# Patient Record
Sex: FEMALE | Race: ASIAN | NOT HISPANIC OR LATINO | URBAN - METROPOLITAN AREA
[De-identification: names, ages, dates, MRNs, and addresses within clinical notes are randomized per-mention and may not be internally consistent; named-entity substitution may affect disease eponyms.]

---

## 2024-11-03 ENCOUNTER — INPATIENT (INPATIENT)
Facility: HOSPITAL | Age: 69
LOS: 2 days | Discharge: HOME | DRG: 379 | End: 2024-11-06
Attending: INTERNAL MEDICINE | Admitting: INTERNAL MEDICINE
Payer: MEDICARE

## 2024-11-03 VITALS
WEIGHT: 126.99 LBS | RESPIRATION RATE: 20 BRPM | HEIGHT: 65 IN | OXYGEN SATURATION: 100 % | SYSTOLIC BLOOD PRESSURE: 111 MMHG | TEMPERATURE: 98 F | HEART RATE: 89 BPM | DIASTOLIC BLOOD PRESSURE: 76 MMHG

## 2024-11-03 DIAGNOSIS — K92.1 MELENA: ICD-10-CM

## 2024-11-03 DIAGNOSIS — K92.2 GASTROINTESTINAL HEMORRHAGE, UNSPECIFIED: ICD-10-CM

## 2024-11-03 DIAGNOSIS — Z29.9 ENCOUNTER FOR PROPHYLACTIC MEASURES, UNSPECIFIED: ICD-10-CM

## 2024-11-03 DIAGNOSIS — N39.0 URINARY TRACT INFECTION, SITE NOT SPECIFIED: ICD-10-CM

## 2024-11-03 DIAGNOSIS — I10 ESSENTIAL (PRIMARY) HYPERTENSION: ICD-10-CM

## 2024-11-03 LAB
ALBUMIN SERPL ELPH-MCNC: 3.1 G/DL — LOW (ref 3.5–5)
ALP SERPL-CCNC: 28 U/L — LOW (ref 40–120)
ALT FLD-CCNC: 30 U/L DA — SIGNIFICANT CHANGE UP (ref 10–60)
ANION GAP SERPL CALC-SCNC: 6 MMOL/L — SIGNIFICANT CHANGE UP (ref 5–17)
APPEARANCE UR: ABNORMAL
APTT BLD: 23.2 SEC — LOW (ref 24.5–35.6)
AST SERPL-CCNC: 23 U/L — SIGNIFICANT CHANGE UP (ref 10–40)
BASOPHILS # BLD AUTO: 0.04 K/UL — SIGNIFICANT CHANGE UP (ref 0–0.2)
BASOPHILS NFR BLD AUTO: 0.4 % — SIGNIFICANT CHANGE UP (ref 0–2)
BILIRUB SERPL-MCNC: 0.3 MG/DL — SIGNIFICANT CHANGE UP (ref 0.2–1.2)
BILIRUB UR-MCNC: NEGATIVE — SIGNIFICANT CHANGE UP
BUN SERPL-MCNC: 31 MG/DL — HIGH (ref 7–18)
CALCIUM SERPL-MCNC: 8 MG/DL — LOW (ref 8.4–10.5)
CHLORIDE SERPL-SCNC: 111 MMOL/L — HIGH (ref 96–108)
CO2 SERPL-SCNC: 24 MMOL/L — SIGNIFICANT CHANGE UP (ref 22–31)
COLOR SPEC: YELLOW — SIGNIFICANT CHANGE UP
CREAT SERPL-MCNC: 0.56 MG/DL — SIGNIFICANT CHANGE UP (ref 0.5–1.3)
DIFF PNL FLD: ABNORMAL
EGFR: 98 ML/MIN/1.73M2 — SIGNIFICANT CHANGE UP
EOSINOPHIL # BLD AUTO: 0.02 K/UL — SIGNIFICANT CHANGE UP (ref 0–0.5)
EOSINOPHIL NFR BLD AUTO: 0.2 % — SIGNIFICANT CHANGE UP (ref 0–6)
FOLATE SERPL-MCNC: 6 NG/ML — SIGNIFICANT CHANGE UP
GLUCOSE SERPL-MCNC: 122 MG/DL — HIGH (ref 70–99)
GLUCOSE UR QL: 100 MG/DL
HCT VFR BLD CALC: 17.9 % — CRITICAL LOW (ref 34.5–45)
HGB BLD-MCNC: 6 G/DL — CRITICAL LOW (ref 11.5–15.5)
IMM GRANULOCYTES NFR BLD AUTO: 0.6 % — SIGNIFICANT CHANGE UP (ref 0–0.9)
INR BLD: 1.1 RATIO — SIGNIFICANT CHANGE UP (ref 0.85–1.16)
IRON SATN MFR SERPL: 86 UG/DL — SIGNIFICANT CHANGE UP (ref 40–150)
KETONES UR-MCNC: ABNORMAL MG/DL
LEUKOCYTE ESTERASE UR-ACNC: ABNORMAL
LIDOCAIN IGE QN: 37 U/L — SIGNIFICANT CHANGE UP (ref 13–75)
LYMPHOCYTES # BLD AUTO: 1.72 K/UL — SIGNIFICANT CHANGE UP (ref 1–3.3)
LYMPHOCYTES # BLD AUTO: 16.8 % — SIGNIFICANT CHANGE UP (ref 13–44)
MCHC RBC-ENTMCNC: 32.1 PG — SIGNIFICANT CHANGE UP (ref 27–34)
MCHC RBC-ENTMCNC: 33.5 G/DL — SIGNIFICANT CHANGE UP (ref 32–36)
MCV RBC AUTO: 95.7 FL — SIGNIFICANT CHANGE UP (ref 80–100)
MONOCYTES # BLD AUTO: 0.3 K/UL — SIGNIFICANT CHANGE UP (ref 0–0.9)
MONOCYTES NFR BLD AUTO: 2.9 % — SIGNIFICANT CHANGE UP (ref 2–14)
NEUTROPHILS # BLD AUTO: 8.12 K/UL — HIGH (ref 1.8–7.4)
NEUTROPHILS NFR BLD AUTO: 79.1 % — HIGH (ref 43–77)
NITRITE UR-MCNC: NEGATIVE — SIGNIFICANT CHANGE UP
NRBC # BLD: 0 /100 WBCS — SIGNIFICANT CHANGE UP (ref 0–0)
PH UR: 5 — SIGNIFICANT CHANGE UP (ref 5–8)
PLATELET # BLD AUTO: 240 K/UL — SIGNIFICANT CHANGE UP (ref 150–400)
POTASSIUM SERPL-MCNC: 4.2 MMOL/L — SIGNIFICANT CHANGE UP (ref 3.5–5.3)
POTASSIUM SERPL-SCNC: 4.2 MMOL/L — SIGNIFICANT CHANGE UP (ref 3.5–5.3)
PROT SERPL-MCNC: 5.8 G/DL — LOW (ref 6–8.3)
PROT UR-MCNC: NEGATIVE MG/DL — SIGNIFICANT CHANGE UP
PROTHROM AB SERPL-ACNC: 12.7 SEC — SIGNIFICANT CHANGE UP (ref 9.9–13.4)
RBC # BLD: 1.87 M/UL — LOW (ref 3.8–5.2)
RBC # FLD: 13.7 % — SIGNIFICANT CHANGE UP (ref 10.3–14.5)
SODIUM SERPL-SCNC: 141 MMOL/L — SIGNIFICANT CHANGE UP (ref 135–145)
SP GR SPEC: 1.02 — SIGNIFICANT CHANGE UP (ref 1–1.03)
TROPONIN I, HIGH SENSITIVITY RESULT: 4.9 NG/L — SIGNIFICANT CHANGE UP
UROBILINOGEN FLD QL: 0.2 MG/DL — SIGNIFICANT CHANGE UP (ref 0.2–1)
VIT B12 SERPL-MCNC: 707 PG/ML — SIGNIFICANT CHANGE UP (ref 232–1245)
WBC # BLD: 10.26 K/UL — SIGNIFICANT CHANGE UP (ref 3.8–10.5)
WBC # FLD AUTO: 10.26 K/UL — SIGNIFICANT CHANGE UP (ref 3.8–10.5)

## 2024-11-03 PROCEDURE — 74177 CT ABD & PELVIS W/CONTRAST: CPT | Mod: 26,MC

## 2024-11-03 PROCEDURE — 99285 EMERGENCY DEPT VISIT HI MDM: CPT

## 2024-11-03 PROCEDURE — 71045 X-RAY EXAM CHEST 1 VIEW: CPT | Mod: 26

## 2024-11-03 RX ORDER — ASPIRIN/MAG CARB/ALUMINUM AMIN 325 MG
1 TABLET ORAL
Refills: 0 | DISCHARGE

## 2024-11-03 RX ORDER — PANTOPRAZOLE SODIUM 40 MG/1
40 TABLET, DELAYED RELEASE ORAL ONCE
Refills: 0 | Status: COMPLETED | OUTPATIENT
Start: 2024-11-03 | End: 2024-11-03

## 2024-11-03 RX ORDER — CEFTRIAXONE SODIUM 10 G
1000 VIAL (EA) INJECTION ONCE
Refills: 0 | Status: COMPLETED | OUTPATIENT
Start: 2024-11-03 | End: 2024-11-03

## 2024-11-03 RX ORDER — PANTOPRAZOLE SODIUM 40 MG/1
40 TABLET, DELAYED RELEASE ORAL EVERY 12 HOURS
Refills: 0 | Status: DISCONTINUED | OUTPATIENT
Start: 2024-11-04 | End: 2024-11-05

## 2024-11-03 RX ORDER — CEFTRIAXONE SODIUM 10 G
1000 VIAL (EA) INJECTION EVERY 24 HOURS
Refills: 0 | Status: COMPLETED | OUTPATIENT
Start: 2024-11-04 | End: 2024-11-06

## 2024-11-03 RX ORDER — SODIUM CHLORIDE 9 MG/ML
1000 INJECTION, SOLUTION INTRAMUSCULAR; INTRAVENOUS; SUBCUTANEOUS ONCE
Refills: 0 | Status: COMPLETED | OUTPATIENT
Start: 2024-11-03 | End: 2024-11-03

## 2024-11-03 RX ORDER — ACETAMINOPHEN 500 MG
1000 TABLET ORAL ONCE
Refills: 0 | Status: COMPLETED | OUTPATIENT
Start: 2024-11-03 | End: 2024-11-03

## 2024-11-03 RX ADMIN — PANTOPRAZOLE SODIUM 40 MILLIGRAM(S): 40 TABLET, DELAYED RELEASE ORAL at 16:19

## 2024-11-03 RX ADMIN — Medication 100 MILLIGRAM(S): at 14:23

## 2024-11-03 RX ADMIN — SODIUM CHLORIDE 1000 MILLILITER(S): 9 INJECTION, SOLUTION INTRAMUSCULAR; INTRAVENOUS; SUBCUTANEOUS at 14:23

## 2024-11-03 NOTE — ED PROVIDER NOTE - CLINICAL SUMMARY MEDICAL DECISION MAKING FREE TEXT BOX
Patient presenting with dizziness lightheadedness endorses diarrhea also noting stool has been dark will obtain lab CT assess for surgical abdomen fluid hydrate and reassess

## 2024-11-03 NOTE — H&P ADULT - NSHPPHYSICALEXAM_GEN_ALL_CORE
GENERAL: NAD, lying in bed comfortably   HEAD:  Atraumatic, Normocephalic  EYES: clear conjunctiva and  sclera   ENT: Moist mucous membranes  NECK: Supple  LUNG: Clear to auscultation bilaterally. No rales, wheezing,   HEART: Regular rate and rhythm; No murmurs,   ABDOMEN:+ Diffused abdominal tenderness, Bowel sounds present; Soft,  Nondistended, Negative Rovsing's sign   EXTREMITIES:  2+ Peripheral Pulses, . No clubbing, cyanosis, or edema  NERVOUS SYSTEM:  AAOX3, speech clear. No deficits   SKIN: No rashes or lesions GENERAL: NAD, lying in bed comfortably   HEAD:  Atraumatic, Normocephalic  EYES: clear conjunctiva and  sclera   ENT: Moist mucous membranes  NECK: Supple  LUNG: Clear to auscultation bilaterally. No rales, wheezing,   HEART: Regular rate and rhythm; No murmurs,   ABDOMEN:+ Diffused abdominal tenderness, Bowel sounds present; Soft,  Nondistended,   RECTAL: Dark stool noted in the anal canal, no fissure  ( Chaperone PCA Maria Luisa)  EXTREMITIES:  2+ Peripheral Pulses, . No clubbing, cyanosis, or edema  NERVOUS SYSTEM:  AAOX3, speech clear. No deficits   SKIN: No rashes or lesions

## 2024-11-03 NOTE — H&P ADULT - ASSESSMENT
70 yo F, from home with PMH of HTN,  present with 3 days history of  diarrhea ( 3x/day), dark stool and diffused abdominal pain,  Admitted to medicine for Melena 2/2 chronic gastritis  70 yo F, from home with PMH of HTN,  present with 3 days history of  diarrhea ( 3x/day), dark stool and diffused abdominal pain,  Admitted to medicine for Melena 2/2 chronic gastritis, UTI      MED [facility]

## 2024-11-03 NOTE — H&P ADULT - PROBLEM SELECTOR PLAN 1
p/w 3 days history of abdominal pain, diarrhea (3x/day), black stool  v/s BP 95/63, HR 65, Temp 98.5  PE: Diffused abdominal tenderness  Patient recently had EGD 10/30/24 which showed Duodenitis without bleeding, Chronic superficcial gastritis without bleeding. (REPORT IN CHART)  s/p 2 pRBC, PPI, CTX in the ED   - Started IV PPI BID  - DIET: NPO   - Transfuse if Hgb < 7   - Consult GI in the AM   - p/w 3 days history of abdominal pain, diarrhea (3x/day), black stool  v/s BP 95/63, HR 65, Temp 98.5  Hgb 6 on admission   PE: Diffused abdominal tenderness, dark stool noted in the anal canal  Patient recently had EGD 10/30/24 which showed Duodenitis without bleeding, Chronic superficcial gastritis without bleeding. (REPORT IN CHART)  CTA&P: Negative   s/p 2 pRBC, PPI, CTX in the ED   - Started IV PPI BID  - DIET: NPO   - Transfuse if Hgb < 7   - Consult GI in the AM   - CBC Q4hrs p/w 3 days history of abdominal pain, diarrhea (3x/day), black stool  v/s BP 95/63, HR 65, Temp 98.5  Hgb 6 on admission   PE: Diffused abdominal tenderness, dark stool noted in the anal canal  Patient recently had EGD 10/30/24 which showed Duodenitis without bleeding, Chronic superficcial gastritis without bleeding. (REPORT IN CHART)  CTA&P: Negative   s/p 2 pRBC, PPI, CTX in the ED   - Started IV PPI BID  - DIET: NPO   - Transfuse if Hgb < 7   - CBC Q4hrs  - f/u Iron studies   - GI consult in the AM

## 2024-11-03 NOTE — H&P ADULT - ATTENDING COMMENTS
68 yo F, from home with PMH of HTN,  present with 3 days history of  diarrhea ( 3x/day), dark stool and diffused abdominal pain,  Patient recently had a routine EGD done 4 days ago which showed she had Duodenitis without bleeding and chronic superficial gastritis without bleeding. Patient reports a day after the EGD that's when she developed the abdominal pain,  diarrhea and black stools. Today patient started having dizziness with room spinning sensation. Patient reports that she takes Aspirin daily and  another pain medication which her PCP prescribed. Patient denies fever, chest pain, SOB, vomiting, nausea,  hematuria, dysuria , urinary frequency/urgency     In the ED,   v/s: 95/63, 65, 98.5, 99% RA   Labs: WBC 10k, Hgb 6, HCT 17.9, Trop 4.9  EK BPM, NSR   UA: Positive   CXR: Neg   CTA&P with iv: Negative   s/p CTX, PPI, 2pRBC  s/p 1L NS bolus        assessment  --  symptomatic anemia likely 2nd to gi bleed, uti, h/o htn, gastritis, duodenitis    plan  --  admit to med hold aspirin, transfuse 2 units prbc, rocephin, cont preadmit home meds, gi and dvt prophylaxis  cbc, bmp, mg, phos, lipids, tsh, bld cx, ua, ucx, iron panel,     gi cons

## 2024-11-03 NOTE — H&P ADULT - NSHPREVIEWOFSYSTEMS_GEN_ALL_CORE
Patient is seen at the request of Edward Sweeney MD    Subjective     Patient ID: Kathy is a 69 year old female.  Reason for Consult:    Chief Complaint   Patient presents with   • Office Visit   • Follow-up   \      HPI:   ==== Kathy is a 69-year-old lady presenting for cardiology follow-up, 20 by her daughter, had hip surgery January 16 at [facility] continue to get physical therapy walking with a walker since then she had been taking the nifedipine half a tablet as needed if blood pressure is high??  At any rate she has not used it much she was taking of the Norco and tramadol and taking now 600 mg ibuprofen 1-2 times a day    He is still taking some meclizine for dizziness even though she does not have true vertigo    She is not sleeping well she cannot say why and what is bothering her at night definitely is not the palpitation is not the chest pain sometimes she feels it is just laying down makes her uncomfortable and feel discomfort in her chest and shortness of breath             Past Medical History:   Diagnosis Date   • Allergy    • Chest pain    • Essential (primary) hypertension    • GERD (gastroesophageal reflux disease)    • High cholesterol    • Light headedness    • OAB (overactive bladder)    • Obese    • Osteoarthritis    • Sciatica    • Severe left inguinal pain    • SOB (shortness of breath)    • Use of cane as ambulatory aid     uses only at certain times- NOT all the time   • Vertigo    • Wears glasses    • Wears partial dentures        ALLERGIES:   Allergen Reactions   • Clindamycin Other (See Comments)     \"doesn't remember reaction\"   • Lasix PRURITUS   • Penicillins PRURITUS   • Sulfa Antibiotics HIVES        Past Surgical History:   Procedure Laterality Date   • Breast surgery      biopsy w/ chip placement    • Colonoscopy     • Egd     • Foot surgery Bilateral     some bone removed from the toes of each foot   • Remv 2nd cataract,corn-scler sectn Bilateral    •  Shoulder surgery Right    • Thyroidectomy, partial     • Tibial traction pin Right     right ankle/leg ORIF   • Tubal ligation         Family History   Problem Relation Age of Onset   • Stroke Mother    • Tuberculosis Mother    • Patient is unaware of any medical problems Father    • Diabetes Sister    • Stroke Other    • Myocardial Infarction Other        Social History     Tobacco Use   • Smoking status: Former     Types: Cigarettes   • Smokeless tobacco: Never   • Tobacco comments:     quit over 35 years ago   Vaping Use   • Vaping Use: Former   Substance Use Topics   • Alcohol use: Yes     Comment: on rare occasions   • Drug use: Never        Recent hospitalization: [facility] from 5 18-5 19 for shortness of breath     Review of Systems:   ================    Constitutional:  No chills, and no malaise  Eyes:  No change in eyesight, no pain  Ears, nose, mouth, throat, and face:  No pain  Respiratory:  No hemoptysis  Cardiovascular:  No palpitation  Gastrointestinal:  No melena  Genitourinary:  No hematuria  Musculoskeletal:  No muscle pain  Neurological:  No change in speech  Behvioral/Psych:  No change in mood      Objective  =========    Vitals:    03/07/23 1034 03/07/23 1052   BP: 135/62 131/59   BP Location: LUE - Left upper extremity RUE - Right upper extremity   Patient Position: Sitting Sitting   Pulse: 67 65   Resp: 16 16   Temp:  98 °F (36.7 °C)   TempSrc:  Oral   SpO2: 99% 99%   Weight: 95.7 kg (210 lb 13.9 oz) 95.7 kg (210 lb 13.9 oz)   Height: 5' 4\" (1.626 m) 5' 4\" (1.626 m)   PainSc:  0  0             Physical Exam:  =============      General: Alert, cooperative, no distress.  Head: No obvious abnormality  Eyes: Normal, no jaundice.  Throat: Lips, mucosa, moist and normal.  Neck: Supple.Carotid pulses: Normal without bruits.  Back: Symmetric.  Lungs: Clear, no wheezing, no rhonchi and no rales.  Heart:  Auscultation of heart: Bigeminal rhythm with a soft ejection systolic murmur.     Abdomen   No masses, tenderness or hepatosplenomegaly.    Examination of extremities  No peripheral edema.    Musculoskeletal   Normal gait, normal muscle tone.    Neurologic   Oriented to person, place and time. Normal affect.  No apparent deficit        Lab:  ===      CBC:   Lab Results   Component Value Date    WBC 4.8 12/29/2022    WBC 5.1 11/03/2020    RBC 5.03 12/29/2022    RBC 5.46 (H) 11/03/2020     BMP:   Lab Results   Component Value Date    GLUCOSE 91 12/29/2022    GLUCOSE 94 11/03/2020    SODIUM 143 12/29/2022    SODIUM 143 11/03/2020    POTASSIUM 3.8 12/29/2022    POTASSIUM 4.0 11/03/2020    CHLORIDE 105 12/29/2022    CHLORIDE 107 11/03/2020    BUN 13 12/29/2022    BUN 15 11/03/2020    CREATININE 0.87 12/29/2022    CREATININE 0.92 11/03/2020    CALCIUM 9.1 12/29/2022    CALCIUM 9.1 11/03/2020     CMP:  Lab Results   Component Value Date    SODIUM 143 12/29/2022    SODIUM 143 11/03/2020    POTASSIUM 3.8 12/29/2022    POTASSIUM 4.0 11/03/2020    CHLORIDE 105 12/29/2022    CHLORIDE 107 11/03/2020    ANIONGAP 11 12/29/2022    ANIONGAP 12 11/03/2020    GLUCOSE 91 12/29/2022    GLUCOSE 94 11/03/2020    BUN 13 12/29/2022    BUN 15 11/03/2020    CREATININE 0.87 12/29/2022    CREATININE 0.92 11/03/2020    ALBUMIN 3.6 05/18/2022    ALBUMIN 3.9 11/03/2020    CALCIUM 9.1 12/29/2022    CALCIUM 9.1 11/03/2020    AST 27 05/18/2022    AST 14 11/03/2020    GFRNA 64 11/03/2020    GFRA 74 11/03/2020     Cardiac markers: No results found for: CPK, CKMB, TROPONINI, MYOGLOBIN  BNP:   Lab Results   Component Value Date    BNP 46 12/14/2016       Magnesium level 2.3    Cardiac test :  ==========  Echocardiogram 12/16/2021  STUDY CONCLUSIONS  SUMMARY:     1. Left ventricle: The cavity size is normal. Wall thickness is normal.     The ejection fraction was measured by biplane method of disks. Doppler     parameters are consistent with abnormal left ventricular relaxation and     increased filling pressure (grade 2 diastolic  dysfunction). The     ejection fraction is 51%.  2. Left atrium: The atrium is mildly dilated.  3. Right ventricle: Systolic function is normal. The RV pressure during     systole by Doppler is 39mm Hg.     Event monitor 30 days  Interpretation Summary    Mrs. Landeros is a 68-year-old lady presenting for the evaluation of palpitation PACs and PVCs and to exclude atrial fibrillation.  Quality of the recording is good  Event monitor duration 30 days 12/16/2021 until 1/15/2022.  Basic rhythm is sinus sinus rhythm rate ranging from 65 bpm and the highest 143 bpm.  Occasional premature ventricular complexes less than 1% of the total with couplets and short runs of ventricular tachycardia longest 8 beats her heart rate 192.  Rare premature supraventricular ectopic beats with short runs of PSVT no evidence of atrial fibrillation.  No excessive pauses or blocks    Lexiscan Myoview was done on 2 /7  Summary:     1. Myocardial perfusion imaging: Left ventricular size is normal. There is     no transient ischemic dilation of the left ventricle during stress.     There is a small, mild, partially reversible defect involving the basal     and mid anterior wall(s), possibly due to attenuation from breast     tissue. The noncorrected images show a similar defect present in the     anterior walls on both rest and stress. WIth attenuation correction,     the anterior defect appears to worsen with the correction. I suspect     this is being generated because of significant radiotracer uptake noted     below the diaphragm. There is at baseline breast attenuation artifact.     Although i suspect again this a reversible defect most attributed to     breast attenuation and correction generated artifact, ischemia in the     LAD distribution cannot completely be excluded. Consider other tools to     assess for obstructive CAD like CTA or conventional angiography if     clinically warrented.        This is a borderline study.  2. Gated  SPECT: The calculated left ventricular ejection fraction is 54%.     LV global systolic function is normal.  3. Stress ECG conclusions: The stress ECG is normal.  4. Baseline ECG: Normal sinus rhythm.  Impressions:  Left ventricular global systolic function is normal.  Atypical chest pain    Cardiac catheterization some irregularity of the RCA otherwise no obstructive disease    Radiology :  =========    Results for orders placed in visit on 06/03/20    XR ADVOCATE PROCEDURE    Impression  No acute fracture or dislocation.    -----  F I N A L  -----    Transcribed By: JADON  06/03/20 11:19 pm    Dictated By:            MARCO A, CARMEN MCDOWELL MD    Electronically Reviewed and Approved By:           CARMEN STRICKLAND MD  06/03/20 11:19 pm        Assessment:  ==============     Orthopnea  (primary encounter diagnosis)  Ventricular arrhythmia  Benign essential hypertension  Obesity with body mass index (BMI) of 35.0 to 39.9 without comorbidity  Ventricular tachycardia, non-sustained  Severe obesity (BMI 35.0-39.9) with comorbidity (CMD)      Plan:  ========  Orthopnea get echocardiogram    Ventricular tachycardia nonsustained  no evidence of ischemia or left ventricular dysfunction better with beta-blockers continue metoprolol       Blood pressure  is better controlled, still not getting enough sleep will help to continue the metoprolol her blood pressure    Stable admission she is taking the nifedipine as needed?  Probably can stop it she hardly is using it        Monitoring weight and weight this time she has not lost any weight    Orders Placed This Encounter   • TRANSTHORACIC ECHO(TTE) COMPLETE W/ W/O IMAGING AGENT             Current Outpatient Medications   Medication Sig Dispense Refill   • metoPROLOL tartrate (LOPRESSOR) 25 MG tablet TAKE 1 TABLET BY MOUTH TWICE A  tablet 3   • oxybutynin (DITROPAN) 5 MG tablet TAKE 1 TABLET BY MOUTH EVERYDAY AT BEDTIME 90 tablet 1   • NIFEdipine CC (ADALAT CC) 30 MG  24 hr tablet TAKE 1 TABLET BY MOUTH DAILY (TAKE 15 MG DAILY WHEN NEEDED ONLY, WHEN SBP>140) 90 tablet 1   • Cholecalciferol (Vitamin D) 125 MCG (5000 UT) Cap Take 125 mcg by mouth daily.     • gabapentin (NEURONTIN) 300 MG capsule Take 1 capsule by mouth every 8 hours. 30 capsule 0   • HYDROcodone-acetaminophen (Norco)  MG per tablet Take 1 tablet by mouth every 4 hours as needed for Pain. 40 tablet 0   • apixaBAN (Eliquis) 2.5 MG Tab Take 1 tablet by mouth in the morning and 1 tablet in the evening. 60 tablet 0   • ibuprofen (MOTRIN) 800 MG tablet Take 1 tablet by mouth every 8 hours as needed for Pain. 60 tablet 0   • montelukast (SINGULAIR) 10 MG tablet TAKE 1 TABLET BY MOUTH EVERY DAY IN THE EVENING FOR 30 DAYS     • gabapentin (NEURONTIN) 300 MG capsule TAKE 1 CAPSULE BY MOUTH THREE TIMES A  capsule 1   • cholecalciferol (VITAMIN D) 25 mcg (1,000 units) tablet Take 25 mcg by mouth daily.     • ferrous sulfate 325 (65 FE) MG tablet TAKE 1 TABLET BY MOUTH EVERY DAY WITH BREAKFAST 90 tablet 1   • cyclobenzaprine (FLEXERIL) 10 MG tablet TAKE 1 TABLET BY MOUTH EVERYDAY AT BEDTIME 90 tablet 1   • meclizine (ANTIVERT) 12.5 MG tablet TAKE 1 TABLET BY MOUTH 3 TIMES A DAY AS NEEDED FOR DIZZINESS 90 tablet 1   • naLOXone (NARCAN) 4 MG/0.1ML nasal spray Call 911. [place] the content of 1 device into 1 nostril. May repeat with 2nd device in alternate nostril if no response in 2-3 minutes. 2 each 1     No current facility-administered medications for this visit.          Electronically signed by:  Jayson Devine MD, 3/7/2023    REVIEW OF SYSTEMS:  CONSTITUTIONAL: No fevers or chills  EYES: No conjunctiva redness, No eye discharge  ENT: No nasal congestion, No sore throat, No ear pain,   NECK: No pain or stiffness  RESPIRATORY: No cough, SOB,  wheezing, hemoptysis  CARDIOVASCULAR: No chest pain or  palpitations  GASTROINTESTINAL: + abdominal pain. No nausea, vomiting, diarrhea or constipation.  GENITOURINARY: No dysuria, frequency or hematuria  NEUROLOGICAL: No headache,  SKIN: No itching, rashes,   All other review of systems is negative unless indicated above. REVIEW OF SYSTEMS:  CONSTITUTIONAL: No fevers or chills  EYES: No conjunctiva redness, No eye discharge  ENT: No nasal congestion, No sore throat, No ear pain,   NECK: No pain or stiffness  RESPIRATORY: No cough, SOB,  wheezing, hemoptysis  CARDIOVASCULAR: No chest pain or  palpitations  GASTROINTESTINAL: + abdominal pain. No nausea, vomiting, diarrhea or constipation.  GENITOURINARY: + urinary frequency/urgency No dysuria, or hematuria  NEUROLOGICAL:+ dizziness,  No headache,  SKIN: No itching, rashes,   All other review of systems is negative unless indicated above.

## 2024-11-03 NOTE — H&P ADULT - PROBLEM SELECTOR PLAN 2
Hx of HTN, patient does not know the med  - MED REC p/w 1 day history urinary frequency/urgency   UA positive  s/p CTX 1g  in the ED  - Started on CTX 1g   - f/u Ucx

## 2024-11-03 NOTE — H&P ADULT - HISTORY OF PRESENT ILLNESS
70 yo F, from home with PMH of HTN,  present with 3 days history of  diarrhea ( 3x/day), dark stool and diffused abdominal pain,  Patient recently had a routine EGD done 4 days ago which showed she had Duodenitis without bleeding and chronic superficial gastritis without bleeding. Patient reports a day after the EGD that's when she developed the abdominal pain,  diarrhea and black stools. Today patient started having dizziness with room spinning sensation. Patient reports that she takes Aspirin daily and  another pain medication which her PCP prescribed. Patient denies fever, chest pain, SOB, vomiting, nausea,  hematuria, dysuria , urinary frequency/urgency     In the ED,   v/s: 95/63, 65, 98.5, 99% RA   Labs: WBC 10k, Hgb 6, HCT 17.9, Trop 4.9  EK BPM, NSR   UA: Positive   CXR: Neg   CTA&P with iv: Negative   s/p CTX, PPI, 2pRBC  s/p 1L NS bolus   70 yo F, from home with PMH of HTN,  present with 3 days history of  diarrhea ( 3x/day), dark stool and diffused abdominal pain, Accompanied with 1 day history urinary frequency/urgency and dizziness with room spinning sensation. Patient recently had a routine EGD done 4 days ago which showed she had Duodenitis without bleeding and chronic superficial gastritis without bleeding. Patient reports a day after the EGD that's when she developed the abdominal pain,  diarrhea and black stools.  Patient reports that she takes Aspirin daily and  another pain medication which her PCP prescribed. Patient denies fever, chest pain, SOB, vomiting, nausea,  hematuria, dysuria ,       In the ED,   v/s: 95/63, 65, 98.5, 99% RA   Labs: WBC 10k, Hgb 6, HCT 17.9, Trop 4.9  EK BPM, NSR   UA: Positive   CXR: Neg   CTA&P: Negative   s/p CTX, PPI, 2pRBC  s/p 1L NS bolus   68 yo F, from home with PMH of HTN,  present with 3 days history of  diarrhea ( 3x/day), dark stool and diffused abdominal pain, Accompanied with 1 day history urinary frequency/urgency and dizziness with room spinning sensation. Patient recently had a routine EGD done 4 days ago which showed  Duodenitis without bleeding and chronic superficial gastritis without bleeding. Patient states a day after the EGD,  she developed the abdominal pain,  diarrhea and black stools.  Patient states that she takes Aspirin daily and  another pain medication which her PCP prescribed. Patient denies fever, chest pain, SOB, vomiting, nausea,  hematuria, dysuria ,       In the ED,   v/s: 95/63, 65, 98.5, 99% RA   Labs: WBC 10k, Hgb 6, HCT 17.9, Trop 4.9  EK BPM, NSR   UA: Positive   CXR: Neg   CTA&P: Negative   s/p CTX, PPI, 2pRBC  s/p 1L NS bolus

## 2024-11-03 NOTE — ED ADULT NURSE NOTE - OBJECTIVE STATEMENT
Pt AOx4, ambulatory, c/o dizziness, n/v x 2 days. Pt denies CP, SOB, changes in vision. No neuro deficit noted.

## 2024-11-03 NOTE — ED PROVIDER NOTE - OBJECTIVE STATEMENT
Patient here-year-old presenting with lightheadedness dizziness preceded with diarrhea for 2 days denies any chest pain headache nausea vomiting focal weakness numbness

## 2024-11-03 NOTE — ED ADULT NURSE NOTE - IN THE PAST 12 MONTHS HAVE YOU USED DRUGS OTHER THAN THOSE REQUIRED FOR MEDICAL REASON?
"Wt Readings from Last 2 Encounters:   07/17/18 67.8 kg (149 lb 7.6 oz) (98 %)*   06/05/18 71.2 kg (156 lb 15.5 oz) (99 %)*     * Growth percentiles are based on CDC (Girls, 2-20 Years) data.     Ht Readings from Last 2 Encounters:   07/17/18 1.574 m (5' 1.97\") (86 %)*   06/05/18 1.57 m (5' 1.81\") (88 %)*     * Growth percentiles are based on CDC (Girls, 2-20 Years) data.     [facility] [[id number]]  Chief Complaint   Patient presents with     Weight Problem     Follow up     Initial /61 (BP Location: Right arm, Patient Position: Sitting, Cuff Size: Adult Regular)   Pulse 93   Ht 1.59 m (5' 2.6\")   Wt 78.8 kg (173 lb 11.6 oz)   LMP 01/28/2019   BMI 31.17 kg/m   Estimated body mass index is 31.17 kg/m  as calculated from the following:    Height as of this encounter: 1.59 m (5' 2.6\").    Weight as of this encounter: 78.8 kg (173 lb 11.6 oz).  Medication Reconciliation: complete    " No

## 2024-11-04 LAB
ALBUMIN SERPL ELPH-MCNC: 2.8 G/DL — LOW (ref 3.5–5)
ALP SERPL-CCNC: 27 U/L — LOW (ref 40–120)
ALT FLD-CCNC: 20 U/L DA — SIGNIFICANT CHANGE UP (ref 10–60)
ANION GAP SERPL CALC-SCNC: 4 MMOL/L — LOW (ref 5–17)
AST SERPL-CCNC: 23 U/L — SIGNIFICANT CHANGE UP (ref 10–40)
BILIRUB SERPL-MCNC: 1.9 MG/DL — HIGH (ref 0.2–1.2)
BUN SERPL-MCNC: 15 MG/DL — SIGNIFICANT CHANGE UP (ref 7–18)
CALCIUM SERPL-MCNC: 7.8 MG/DL — LOW (ref 8.4–10.5)
CHLORIDE SERPL-SCNC: 115 MMOL/L — HIGH (ref 96–108)
CO2 SERPL-SCNC: 25 MMOL/L — SIGNIFICANT CHANGE UP (ref 22–31)
CREAT SERPL-MCNC: 0.48 MG/DL — LOW (ref 0.5–1.3)
EGFR: 101 ML/MIN/1.73M2 — SIGNIFICANT CHANGE UP
GLUCOSE SERPL-MCNC: 106 MG/DL — HIGH (ref 70–99)
HCT VFR BLD CALC: 22.1 % — LOW (ref 34.5–45)
HCT VFR BLD CALC: 22.9 % — LOW (ref 34.5–45)
HCT VFR BLD CALC: 22.9 % — LOW (ref 34.5–45)
HGB BLD-MCNC: 7.7 G/DL — LOW (ref 11.5–15.5)
HGB BLD-MCNC: 7.8 G/DL — LOW (ref 11.5–15.5)
HGB BLD-MCNC: 7.9 G/DL — LOW (ref 11.5–15.5)
MAGNESIUM SERPL-MCNC: 2 MG/DL — SIGNIFICANT CHANGE UP (ref 1.6–2.6)
MCHC RBC-ENTMCNC: 30.7 PG — SIGNIFICANT CHANGE UP (ref 27–34)
MCHC RBC-ENTMCNC: 31.3 PG — SIGNIFICANT CHANGE UP (ref 27–34)
MCHC RBC-ENTMCNC: 31.7 PG — SIGNIFICANT CHANGE UP (ref 27–34)
MCHC RBC-ENTMCNC: 34.1 G/DL — SIGNIFICANT CHANGE UP (ref 32–36)
MCHC RBC-ENTMCNC: 34.5 G/DL — SIGNIFICANT CHANGE UP (ref 32–36)
MCHC RBC-ENTMCNC: 34.8 G/DL — SIGNIFICANT CHANGE UP (ref 32–36)
MCV RBC AUTO: 90.2 FL — SIGNIFICANT CHANGE UP (ref 80–100)
MCV RBC AUTO: 90.9 FL — SIGNIFICANT CHANGE UP (ref 80–100)
MCV RBC AUTO: 90.9 FL — SIGNIFICANT CHANGE UP (ref 80–100)
NRBC # BLD: 0 /100 WBCS — SIGNIFICANT CHANGE UP (ref 0–0)
PHOSPHATE SERPL-MCNC: 2.8 MG/DL — SIGNIFICANT CHANGE UP (ref 2.5–4.5)
PLATELET # BLD AUTO: 190 K/UL — SIGNIFICANT CHANGE UP (ref 150–400)
PLATELET # BLD AUTO: 196 K/UL — SIGNIFICANT CHANGE UP (ref 150–400)
PLATELET # BLD AUTO: 203 K/UL — SIGNIFICANT CHANGE UP (ref 150–400)
POTASSIUM SERPL-MCNC: 3.6 MMOL/L — SIGNIFICANT CHANGE UP (ref 3.5–5.3)
POTASSIUM SERPL-SCNC: 3.6 MMOL/L — SIGNIFICANT CHANGE UP (ref 3.5–5.3)
PROT SERPL-MCNC: 4.9 G/DL — LOW (ref 6–8.3)
RBC # BLD: 2.43 M/UL — LOW (ref 3.8–5.2)
RBC # BLD: 2.52 M/UL — LOW (ref 3.8–5.2)
RBC # BLD: 2.54 M/UL — LOW (ref 3.8–5.2)
RBC # FLD: 15.9 % — HIGH (ref 10.3–14.5)
RBC # FLD: 17.2 % — HIGH (ref 10.3–14.5)
RBC # FLD: 18.3 % — HIGH (ref 10.3–14.5)
SODIUM SERPL-SCNC: 144 MMOL/L — SIGNIFICANT CHANGE UP (ref 135–145)
WBC # BLD: 7.62 K/UL — SIGNIFICANT CHANGE UP (ref 3.8–10.5)
WBC # BLD: 7.84 K/UL — SIGNIFICANT CHANGE UP (ref 3.8–10.5)
WBC # BLD: 8.78 K/UL — SIGNIFICANT CHANGE UP (ref 3.8–10.5)
WBC # FLD AUTO: 7.62 K/UL — SIGNIFICANT CHANGE UP (ref 3.8–10.5)
WBC # FLD AUTO: 7.84 K/UL — SIGNIFICANT CHANGE UP (ref 3.8–10.5)
WBC # FLD AUTO: 8.78 K/UL — SIGNIFICANT CHANGE UP (ref 3.8–10.5)

## 2024-11-04 PROCEDURE — 43235 EGD DIAGNOSTIC BRUSH WASH: CPT

## 2024-11-04 DEVICE — ESOPHAGEAL BALLOON CATH CRE FIXED WIRE 8-9-10MM: Type: IMPLANTABLE DEVICE | Status: FUNCTIONAL

## 2024-11-04 DEVICE — ESOPHAGEAL BALLOON CATH CRE FIXED WIRE 10-11-12MM: Type: IMPLANTABLE DEVICE | Status: FUNCTIONAL

## 2024-11-04 DEVICE — ESOPHAGEAL BALLOON CATH CRE FIXED WIRE 6-7-8MM: Type: IMPLANTABLE DEVICE | Status: FUNCTIONAL

## 2024-11-04 RX ORDER — RALOXIFENE HYDROCHLORIDE 60 MG/1
1 TABLET, FILM COATED ORAL
Refills: 0 | DISCHARGE

## 2024-11-04 RX ORDER — CYANOCOBALAMIN (VITAMIN B-12) 1000MCG/15
100 LIQUID (ML) ORAL
Refills: 0 | DISCHARGE

## 2024-11-04 RX ORDER — CHOLECALCIFEROL (VITAMIN D3) 625 MCG
1000 CAPSULE ORAL
Refills: 0 | DISCHARGE

## 2024-11-04 RX ORDER — MAGNESIUM OXIDE 400 MG/1
1 TABLET ORAL
Refills: 0 | DISCHARGE

## 2024-11-04 RX ADMIN — Medication 80 MILLILITER(S): at 21:52

## 2024-11-04 RX ADMIN — PANTOPRAZOLE SODIUM 40 MILLIGRAM(S): 40 TABLET, DELAYED RELEASE ORAL at 05:08

## 2024-11-04 RX ADMIN — Medication 100 MILLIGRAM(S): at 05:08

## 2024-11-04 RX ADMIN — PANTOPRAZOLE SODIUM 40 MILLIGRAM(S): 40 TABLET, DELAYED RELEASE ORAL at 17:56

## 2024-11-04 RX ADMIN — Medication 80 MILLILITER(S): at 05:08

## 2024-11-04 NOTE — PROGRESS NOTE ADULT - SUBJECTIVE AND OBJECTIVE BOX
Patient is a 69y old  Female who presents with a chief complaint of Melena (03 Nov 2024 20:41)    pt seen in icu [  ], reg med floor [   ], bed [  ], chair at bedside [   ], a+o x3 [  ], lethargic [  ],  nad [  ]    hart [  ], ngt [  ], peg [  ], et tube [  ], cent line [  ], picc line [  ]        Allergies    No Known Allergies        Vitals    T(F): 97 (11-04-24 @ 05:10), Max: 98.6 (11-03-24 @ 22:16)  HR: 79 (11-04-24 @ 05:10) (65 - 89)  BP: 115/70 (11-04-24 @ 05:10) (95/63 - 127/74)  RR: 18 (11-04-24 @ 05:10) (15 - 20)  SpO2: 98% (11-04-24 @ 05:10) (97% - 100%)  Wt(kg): --  CAPILLARY BLOOD GLUCOSE      POCT Blood Glucose.: 181 mg/dL (03 Nov 2024 12:22)      Labs                          7.8    8.78  )-----------( 196      ( 04 Nov 2024 01:52 )             22.9       11-03    141  |  111[H]  |  31[H]  ----------------------------<  122[H]  4.2   |  24  |  0.56    Ca    8.0[L]      03 Nov 2024 13:50    TPro  5.8[L]  /  Alb  3.1[L]  /  TBili  0.3  /  DBili  x   /  AST  23  /  ALT  30  /  AlkPhos  28[L]  11-03          Troponin I, High Sensitivity Result: 4.9 ng/L (11-03-24 @ 13:50)        Radiology Results      Meds    MEDICATIONS  (STANDING):  cefTRIAXone   IVPB 1000 milliGRAM(s) IV Intermittent every 24 hours  lactated ringers. 1000 milliLiter(s) (80 mL/Hr) IV Continuous <Continuous>  pantoprazole  Injectable 40 milliGRAM(s) IV Push every 12 hours      MEDICATIONS  (PRN):      Physical Exam    Neuro :  no focal deficits  Respiratory: CTA B/L  CV: RRR, S1S2, no murmurs,   Abdominal: Soft, NT, ND +BS,  Extremities: No edema, + peripheral pulses    ASSESSMENT     symptomatic anemia likely 2nd to gi bleed,   uti,   h/o htn,   gastritis,   duodenitis    Gastrointestinal hemorrhage    HTN (hypertension)    No significant past surgical history        PLAN    admit to med  hold aspirin,   transfuse 2 units prbc,   rocephin,   cont preadmit home meds,   gi and dvt prophylaxis  cbc,   bmp,   mg,   phos,   lipids,  tsh,   bld cx,   ua,   ucx,   iron panel,     gi cons.     Patient is a 69y old  Female who presents with a chief complaint of Melena (03 Nov 2024 20:41)    pt seen in icu [  ], reg med floor [ x  ], bed [ x ], chair at bedside [   ], a+o x3 [ x ], lethargic [  ],  nad [x ]        Allergies    No Known Allergies        Vitals    T(F): 97 (11-04-24 @ 05:10), Max: 98.6 (11-03-24 @ 22:16)  HR: 79 (11-04-24 @ 05:10) (65 - 89)  BP: 115/70 (11-04-24 @ 05:10) (95/63 - 127/74)  RR: 18 (11-04-24 @ 05:10) (15 - 20)  SpO2: 98% (11-04-24 @ 05:10) (97% - 100%)  Wt(kg): --  CAPILLARY BLOOD GLUCOSE      POCT Blood Glucose.: 181 mg/dL (03 Nov 2024 12:22)      Labs                          7.8    8.78  )-----------( 196      ( 04 Nov 2024 01:52 )             22.9       11-03    141  |  111[H]  |  31[H]  ----------------------------<  122[H]  4.2   |  24  |  0.56    Ca    8.0[L]      03 Nov 2024 13:50    TPro  5.8[L]  /  Alb  3.1[L]  /  TBili  0.3  /  DBili  x   /  AST  23  /  ALT  30  /  AlkPhos  28[L]  11-03          Troponin I, High Sensitivity Result: 4.9 ng/L (11-03-24 @ 13:50)        Radiology Results      Meds    MEDICATIONS  (STANDING):  cefTRIAXone   IVPB 1000 milliGRAM(s) IV Intermittent every 24 hours  lactated ringers. 1000 milliLiter(s) (80 mL/Hr) IV Continuous <Continuous>  pantoprazole  Injectable 40 milliGRAM(s) IV Push every 12 hours      MEDICATIONS  (PRN):      Physical Exam    Neuro :  no focal deficits  Respiratory: CTA B/L  CV: RRR, S1S2, no murmurs,   Abdominal: Soft, NT, ND +BS,  Extremities: No edema, + peripheral pulses      ASSESSMENT    symptomatic anemia likely 2nd to gi bleed,   uti,   h/o htn,   gastritis,   duodenitis      PLAN    hold aspirin,   s/p transfuse 2 units prbc,   f/u rept cbc   f/u iron panel   cont protonix   gi cons  cont rocephin,   f/u ucx   cont current meds

## 2024-11-04 NOTE — PROGRESS NOTE ADULT - PROBLEM SELECTOR PLAN 1
p/w 3 days history of abdominal pain, diarrhea (3x/day), black stool  v/s BP 95/63, HR 65, Temp 98.5  Hgb 6 on admission   PE: Diffused abdominal tenderness, dark stool noted in the anal canal  Patient recently had EGD 10/30/24 which showed Duodenitis without bleeding, Chronic superficial gastritis without bleeding. (REPORT IN CHART)  CTA&P: Negative   s/p 2 pRBC, PPI, CTX in the ED   -C/w IV PPI BID  - NPO for now   - Transfuse if Hgb < 7   - CBC Q 12 hrs for now  - f/u Iron studies   - GI consulted f/u recs

## 2024-11-04 NOTE — PROGRESS NOTE ADULT - PROBLEM SELECTOR PLAN 3
Hx of HTN, patient does not know the med  -Takes Metoprolol Succ 12.5mg daily  -BP okay for now- hold in setting of likely GIB

## 2024-11-04 NOTE — DISCHARGE NOTE PROVIDER - HOSPITAL COURSE
Pt is a 68 y/o F Mandarin speaking, from home and lives alone w/ PMH of HTN,  present with 3 days history of  diarrhea ( 3x/day), dark stool and diffused abdominal pain, Accompanied with 1 day history urinary frequency/urgency and dizziness with room spinning sensation. Patient recently had a routine EGD done 4 days ago which showed Duodenitis without bleeding and chronic superficial gastritis without bleeding. Patient states a day after the EGD,  she developed the abdominal pain,  diarrhea and black stools.  Patient states that she takes Aspirin daily and  another pain medication which her PCP prescribed. Patient denies fever, chest pain, SOB, vomiting, nausea,  hematuria, dysuria ,     In the ED, v/s: 95/63, 65, 98.5, 99% RA, Labs: WBC 10k, Hgb 6, HCT 17.9, Trop 4.9, EK BPM, NSR, UA: Positive.  Her imaging CXR and CTAP were negative. She was started on CTX, PPI and received 2 units of PRBC.  Pt was evaluated by GI and had an EGD done here without acute finding.    Pt was started back on a regular diet.    INCOMPLETE::::::::::::24 68 yo F, from home, with pmhx of HTN who presented with diarrhea, dark stool, abdominal pain, dizziness, and urinary frequency. Hgb in ED 6 s/p 2 units pRBC. Pt admitted for melena 2/2 chronic gastritis. GI consulted, s/p EGD 11/04 with no bleeding. Pt with dizziness and blurry vision, Hgb 7.5 s/p 1 units pRBC, with appropriate response in hemoglobin. Pt still with dizziness, started on meclizine. Symptoms have resolved and pt stable for discharge home.     Please note that this a brief summary of hospital course please refer to daily progress notes and consult notes for full course and events. Patient seen and examined at bedside, discussed with medical attending. Patient medically cleared for discharge home.

## 2024-11-04 NOTE — PROGRESS NOTE ADULT - PROBLEM SELECTOR PLAN 2
p/w 1 day history urinary frequency/urgency   UA positive  s/p CTX 1g  in the ED  - c/w CTX 1g   - f/u Ucx

## 2024-11-04 NOTE — DISCHARGE NOTE PROVIDER - NSDCCPCAREPLAN_GEN_ALL_CORE_FT
PRINCIPAL DISCHARGE DIAGNOSIS  Diagnosis: GI bleed  Assessment and Plan of Treatment: There are 2 common types of GI Bleed, Upper GI Bleed and Lower GI Bleed.  Upper GI Bleed affects the esophagus, stomach, and first part of the small intestine. Lower GI Bleed affects the colon and rectum.  Upper GI Bleed signs and symptoms to notify your Health Care Provider are vomiting blood, or coffee ground vomitus, and bowel movements that look like black tar.  Lower GI Bleed signs and symptoms to notify your health care provider are bright red bloody bowel movements.   Take your medications as prescribed by your Gastroenterologist.  If you have had an Endoscopy or Colonoscopy, follow up with your Gastroenterologist for Pathology results.  Avoid NSAIDs unless your Health Care Provider tells you that it is ok (Aspirin, Ibuprofen, Advil, Motrin, Aleve).  Follow up with your Gastroenterologist within 1-2 weeks of discharge.        SECONDARY DISCHARGE DIAGNOSES  Diagnosis: UTI (urinary tract infection)  Assessment and Plan of Treatment: Continue taking your antibiotics as prescribed and monitor for signs and symptoms of infection, such as fever or chills, burning/pain with urination, urinary frequency/hesitancy, cloudy or bloody urine.      Diagnosis: HTN (hypertension)  Assessment and Plan of Treatment: You are being treated for high blood pressure.  Continue taking your medication as prescribed to help prevent or minimize your risk of end organ damage.  Follow up with your doctor for routine blood pressure evaluation and medication regimen.  Report any symptoms of headaces with nausea or vomiting, visual changes, heart palpitation, chest pain or shortness.       PRINCIPAL DISCHARGE DIAGNOSIS  Diagnosis: GI bleed  Assessment and Plan of Treatment: You presented with dark stool concerning for GI bleed. GI was consulted and EGD was done. EGD did not show any bleeding. Your dark stool was likely from your previous EGD that was done. Your hemoglobin was monitored and you received 3 units transfusion while hospitalized. Your hemoglobin is stable and your symptoms have resolved. Please follow up with your PCP for further management. Please also follow up with GI for an outpatient colonoscopy.      SECONDARY DISCHARGE DIAGNOSES  Diagnosis: HTN (hypertension)  Assessment and Plan of Treatment: Your blood pressure medications were held as your blood pressure was normal. Please continue to hold it on discharge. Follow up with your PCP to see when you should restart the medications.    Diagnosis: UTI (urinary tract infection)  Assessment and Plan of Treatment: You also presented with urinary frequency with concerns for a UTI. You completed a course of antibiotics. You do not need to continue any further antibiotics on discharge. Follow up with your PCP.    Diagnosis: Dizziness  Assessment and Plan of Treatment: You have been feeling dizzy. You were started on meclizine. Please continue it on discharge as needed. Follow up with your PCP.

## 2024-11-04 NOTE — CONSULT NOTE ADULT - LYMPHATIC
Please continue to follow your normal dialysis schedule twice a week and follow up with your nephrologist.     Please see [facility] at [address] for AVF maintenance.  No lymphadedenopathy Please continue to follow your normal dialysis schedule twice a week and follow up with your nephrologist.     Please see [facility] at [address] for AVF maintenance.     HD Slot at [facility]    HD slot will be at [address] [address]   APPOINTMENT: Thursday 02/04/21 AT 12:30 pm  Please Call Facility if you are not able to attend  Facility contact # [phone]

## 2024-11-04 NOTE — PATIENT PROFILE ADULT - FALL HARM RISK - HARM RISK INTERVENTIONS
Assistance with ambulation/Assistance OOB with selected safe patient handling equipment/Communicate Risk of Fall with Harm to all staff/Monitor gait and stability/Reinforce activity limits and safety measures with patient and family/Sit up slowly, dangle for a short time, stand at bedside before walking/Tailored Fall Risk Interventions/Visual Cue: Yellow wristband and red socks/Bed in lowest position, wheels locked, appropriate side rails in place/Call bell, personal items and telephone in reach/Instruct patient to call for assistance before getting out of bed or chair/Non-slip footwear when patient is out of bed/Isabela to call system/Physically safe environment - no spills, clutter or unnecessary equipment/Purposeful Proactive Rounding/Room/bathroom lighting operational, light cord in reach

## 2024-11-04 NOTE — CONSULT NOTE ADULT - SUBJECTIVE AND OBJECTIVE BOX
INITIAL GI CONSULTATION    Patient is a 69y old  Female who presents with a chief complaint of Melena (2024 06:42)    HPI:  68 yo F, from home with PMH of HTN,  present with 3 days history of  diarrhea ( 3x/day), dark stool and diffused abdominal pain, Accompanied with 1 day history urinary frequency/urgency and dizziness with room spinning sensation. Patient recently had a routine EGD done 4 days ago which showed  Duodenitis without bleeding and chronic superficial gastritis without bleeding. Patient states a day after the EGD,  she developed the abdominal pain,  diarrhea and black stools.  Patient states that she takes Aspirin daily and  another pain medication which her PCP prescribed. Patient denies fever, chest pain, SOB, vomiting, nausea,  hematuria, dysuria ,       In the ED,   v/s: 95/63, 65, 98.5, 99% RA   Labs: WBC 10k, Hgb 6, HCT 17.9, Trop 4.9  EK BPM, NSR   UA: Positive   CXR: Neg   CTA&P: Negative   s/p CTX, PPI, 2pRBC  s/p 1L NS bolus   (2024 20:41)        PMH/PSH:  PAST MEDICAL & SURGICAL HISTORY:  HTN (hypertension)      No significant past surgical history            FH:  FAMILY HISTORY:  No pertinent family history in first degree relatives          MEDS:  MEDICATIONS  (STANDING):  cefTRIAXone   IVPB 1000 milliGRAM(s) IV Intermittent every 24 hours  lactated ringers. 1000 milliLiter(s) (80 mL/Hr) IV Continuous <Continuous>  pantoprazole  Injectable 40 milliGRAM(s) IV Push every 12 hours    MEDICATIONS  (PRN):    Allergies    No Known Allergies    Intolerances          ROS: A detailed set of ROS were asked and negative except those outlined in GI HPI.  ______________________________________________________________________  PHYSICAL EXAM:  T(C): 36.1 (24 @ 05:10), Max: 37 (24 @ 22:16)  HR: 79 (24 @ 05:10)  BP: 115/70 (24 @ 05:10)  RR: 18 (24 @ 05:10)  SpO2: 98% (24 @ 05:10)  Wt(kg): --      GEN: NAD  HEENT: EOMI, conjunctivae anicteric, neck supple, moist mucous membranes  PULM: LSCTAB, no wheezing, rales, or rhonchi  CV: RRR, no m/r/b  GI: Soft, NT, ND; +BS in all four quadrants, no ascites, no Salazar's sign  MSK: ZHANG, no edema  NEURO: A&O x 3, no gross deficits  ______________________________________________________________________  LABS:                        7.7    7.84  )-----------( 190      ( 2024 08:00 )             22.1         141  |  111[H]  |  31[H]  ----------------------------<  122[H]  4.2   |  24  |  0.56    Ca    8.0[L]      2024 13:50    TPro  5.8[L]  /  Alb  3.1[L]  /  TBili  0.3  /  DBili  x   /  AST  23  /  ALT  30  /  AlkPhos  28[L]  03    LIVER FUNCTIONS - ( 2024 13:50 )  Alb: 3.1 g/dL / Pro: 5.8 g/dL / ALK PHOS: 28 U/L / ALT: 30 U/L DA / AST: 23 U/L / GGT: x           PT/INR - ( 2024 13:50 )   PT: 12.7 sec;   INR: 1.10 ratio         PTT - ( 2024 13:50 )  PTT:23.2 sec  ____________________________________________    IMAGING:    *******    This note and its recommendations herein are preliminary until such time as cosigned by an attending.  
[  ] STAT REQUEST              [X   ]ROUTINE REQUEST    Patient is a 69 year old female with GI bleeding. GI consulted to evaluate.       HPI:  69 year old female from home with past medical history significant for HTN,  presented with 3 days history of melena, diarrhea assocated with intermittent diffuse non radiating crampy ab with 3 days history of  diarrhea ( 3x/day), dark stool and diffused abdominal pain. Patient had EGD done 4 days ago and found to have Gastritis and duodenitis with no evidence of bleeding. Patient reports one day after the EGD developed black stool with diarrhea and abdominal pain.  Patient also reports taking ASA daily. Patient denies hematemesis, hematochezia, fever, chills, chest pain, SOB, cough, epistaxis, hemoptysis, cough, hematuria, dysuria, recent traveling or antibiotics intake.           PAIN MANAGEMENT:  Pain Scale:                 6/10  Pain Location:  Diffuse abdominal pain         PAST MEDICAL HISTORY    HTN (hypertension)        PAST SURGICAL HISTORY    No significant past surgical history reported        Allergies    No Known Allergies    Intolerances  None         MEDICATIONS  (STANDING):  cefTRIAXone   IVPB 1000 milliGRAM(s) IV Intermittent every 24 hours  lactated ringers. 1000 milliLiter(s) (80 mL/Hr) IV Continuous <Continuous>  pantoprazole  Injectable 40 milliGRAM(s) IV Push every 12 hours         SOCIAL HISTORY  Advanced Directives:       [ X ] Full Code       [  ] DNR  Marital Status:         [ X ] M      [  ] S      [  ] D       [  ] W  Children:       [ X ] Yes      [  ] No  Occupation:        [  ] Employed       [ X ] Unemployed       [  ] Retired  Diet:       [ X ] Regular       [  ] PEG feeding          [  ] NG tube feeding  Drug Use:           [ X ] Patient denied          [  ] Yes  Alcohol:           [ X ] No             [  ] Yes (socially)         [  ] Yes (chronic)  Tobacco:           [  ] Yes           [ X ] No      FAMILY HISTORY  [ X ] Heart Disease            [ X ] Diabetes             [ X ] HTN             [  ] Colon Cancer             [  ] Stomach Cancer              [  ] Pancreatic Cancer      VITAL SIGNS   Vital Signs Last 24 Hrs  T(C): 36.1 (11-04-24 @ 05:10), Max: 37 (11-03-24 @ 22:16)  T(F): 97 (11-04-24 @ 05:10), Max: 98.6 (11-03-24 @ 22:16)  HR: 79 (11-04-24 @ 05:10) (65 - 89)  BP: 115/70 (11-04-24 @ 05:10) (95/63 - 127/74)  BP(mean): 89 (11-03-24 @ 23:23) (89 - 89)  RR: 18 (11-04-24 @ 05:10) (15 - 20)  SpO2: 98% (11-04-24 @ 05:10) (97% - 100%)  Daily Height in cm: 165.1 (03 Nov 2024 12:15)            CBC Full  -  ( 04 Nov 2024 08:00 )  WBC Count : 7.84 K/uL  RBC Count : 2.43 M/uL  Hemoglobin : 7.7 g/dL  Hematocrit : 22.1 %  Platelet Count - Automated : 190 K/uL  Mean Cell Volume : 90.9 fl  Mean Cell Hemoglobin : 31.7 pg  Mean Cell Hemoglobin Concentration : 34.8 g/dL  Auto Neutrophil # : x  Auto Lymphocyte # : x  Auto Monocyte # : x  Auto Eosinophil # : x  Auto Basophil # : x  Auto Neutrophil % : x  Auto Lymphocyte % : x  Auto Monocyte % : x  Auto Eosinophil % : x  Auto Basophil % : x      11-04    144  |  115[H]  |  15  ----------------------------<  106[H]  3.6   |  25  |  0.48[L]    Ca    7.8[L]      04 Nov 2024 08:00  Phos  2.8     11-04  Mg     2.0     11-04    TPro  4.9[L]  /  Alb  2.8[L]  /  TBili  1.9[H]  /  DBili  x   /  AST  23  /  ALT  20  /  AlkPhos  27[L]  11-04    Lipase: 37 U/L (11-03 @ 13:50)     PT/INR - ( 03 Nov 2024 13:50 )   PT: 12.7 sec;   INR: 1.10 ratio       PTT - ( 03 Nov 2024 13:50 )  PTT:23.2 sec    Urinalysis with Rflx Culture (11.03.24 @ 13:00)   Urine Appearance: Cloudy  Color: Yellow  Specific Gravity: 1.023  pH Urine: 5.0  Protein, Urine: Negative mg/dL  Glucose Qualitative, Urine: 100 mg/dL  Ketone - Urine: Trace mg/dL  Blood, Urine: Small  Bilirubin: Negative  Urobilinogen: 0.2 mg/dL  Leukocyte Esterase Concentration: Moderate  Nitrite: Negative    RADIOLOGY/IMAGING                  ACC: [id number] EXAM:  CT ABDOMEN AND PELVIS IC   ORDERED BY: ASHLEY NICK     PROCEDURE DATE:  11/03/2024          INTERPRETATION:  CLINICAL INFORMATION: Diarrhea and abdominal pain    COMPARISON: None.    CONTRAST/COMPLICATIONS:  IV Contrast: Omnipaque 350  90 cc administered   10 cc discarded  Oral Contrast: NONE  Complications: None reported at time of study completion    PROCEDURE:  CT of the Abdomen and Pelvis was performed.  Sagittal and coronal reformats were performed.    FINDINGS:  LOWER CHEST: Within normal limits.    LIVER: Within normal limits.  BILE DUCTS: Normal caliber.  GALLBLADDER: Within normal limits.  SPLEEN: Within normal limits.  PANCREAS: Within normal limits.  ADRENALS: Within normal limits.  KIDNEYS/URETERS: Within normal limits.    BLADDER: Within normal limits.  REPRODUCTIVE ORGANS: Hysterectomy.    BOWEL: No bowel obstruction. Appendix is normal.  PERITONEUM/RETROPERITONEUM: Within normal limits.  VESSELS: Within normal limits.  LYMPH NODES: No lymphadenopathy.  ABDOMINAL WALL: Within normal limits.  BONES: Within normal limits.    IMPRESSION:  Unremarkable abdominal pelvic CT

## 2024-11-04 NOTE — PROGRESS NOTE ADULT - SUBJECTIVE AND OBJECTIVE BOX
NP Note discussed with  Primary Attending    Patient is a 69y old Female who presents with a chief complaint of Melena (04 Nov 2024 08:30)      INTERVAL HPI/OVERNIGHT EVENTS: no new complaints at time of eval. Daughter Mckenzie  at bedside at time of eval.     MEDICATIONS  (STANDING):  cefTRIAXone   IVPB 1000 milliGRAM(s) IV Intermittent every 24 hours  lactated ringers. 1000 milliLiter(s) (80 mL/Hr) IV Continuous <Continuous>  pantoprazole  Injectable 40 milliGRAM(s) IV Push every 12 hours    MEDICATIONS  (PRN):      __________________________________________________  REVIEW OF SYSTEMS:    CONSTITUTIONAL: No fever,   EYES: no acute visual disturbances  NECK: No pain or stiffness  RESPIRATORY: No cough; No shortness of breath  CARDIOVASCULAR: No chest pain, no palpitations  GASTROINTESTINAL: No pain. No nausea or vomiting; No diarrhea   NEUROLOGICAL: No headache or numbness, no tremors  MUSCULOSKELETAL: No joint pain, no muscle pain  GENITOURINARY: no dysuria, no frequency, no hesitancy  PSYCHIATRY: no depression , no anxiety  ALL OTHER  ROS negative        Vital Signs Last 24 Hrs  T(C): 36.8 (04 Nov 2024 11:33), Max: 37 (03 Nov 2024 22:16)  T(F): 98.3 (04 Nov 2024 11:33), Max: 98.6 (03 Nov 2024 22:16)  HR: 70 (04 Nov 2024 12:12) (64 - 89)  BP: 113/76 (04 Nov 2024 12:12) (95/63 - 127/74)  BP(mean): 87 (04 Nov 2024 12:12) (85 - 89)  RR: 15 (04 Nov 2024 12:12) (14 - 20)  SpO2: 100% (04 Nov 2024 12:12) (97% - 100%)    Parameters below as of 04 Nov 2024 12:12  Patient On (Oxygen Delivery Method): room air        ________________________________________________  PHYSICAL EXAM:  GENERAL: NAD  HEENT: Normocephalic; pale conjunctivae and sclerae clear; moist mucous membranes;   NECK : supple  CHEST/LUNG: Clear to auscultation bilaterally with good air entry   HEART: S1 S2  regular; no murmurs, gallops or rubs  ABDOMEN: Soft, Nontender, Nondistended; Bowel sounds present, no rebound or guarding  EXTREMITIES: no cyanosis; no edema; no calf tenderness  SKIN: warm and dry; no rash  NERVOUS SYSTEM:  Awake and alert; Oriented to place, person and time ; no new deficits    _________________________________________________  LABS:                        7.7    7.84  )-----------( 190      ( 04 Nov 2024 08:00 )             22.1     11-04    144  |  115[H]  |  15  ----------------------------<  106[H]  3.6   |  25  |  0.48[L]    Ca    7.8[L]      04 Nov 2024 08:00  Phos  2.8     11-04  Mg     2.0     11-04    TPro  4.9[L]  /  Alb  2.8[L]  /  TBili  1.9[H]  /  DBili  x   /  AST  23  /  ALT  20  /  AlkPhos  27[L]  11-04    PT/INR - ( 03 Nov 2024 13:50 )   PT: 12.7 sec;   INR: 1.10 ratio         PTT - ( 03 Nov 2024 13:50 )  PTT:23.2 sec  Urinalysis Basic - ( 04 Nov 2024 08:00 )    Color: x / Appearance: x / SG: x / pH: x  Gluc: 106 mg/dL / Ketone: x  / Bili: x / Urobili: x   Blood: x / Protein: x / Nitrite: x   Leuk Esterase: x / RBC: x / WBC x   Sq Epi: x / Non Sq Epi: x / Bacteria: x      CAPILLARY BLOOD GLUCOSE      POCT Blood Glucose.: 181 mg/dL (03 Nov 2024 12:22)        RADIOLOGY & ADDITIONAL TESTS:    Imaging  Reviewed:  YES/NO    Consultant(s) Notes Reviewed:   YES/ No      Plan of care was discussed with patient and /or primary care giver; all questions and concerns were addressed

## 2024-11-04 NOTE — CONSULT NOTE ADULT - ASSESSMENT
A. Melena and anemia in this patient can be due to;        1. Upper GI bleeding secondary to             - Post biopsy given patient being on daily ASA             - Peptic ulcer disease        2. Right side colonic bleeding    Suggestions:    1. Monitor H/H  2. Transfuse PRBC as needed  3. Protonix 40mg BID IV  4. NPO  5. EGD  6. Colonoscopy if EGD finding don't explain patient's bleeding  7. IVF hydration  8. Avoid NSAID  9. Monitor electrolytes  10. DVT prophylaxis

## 2024-11-04 NOTE — DISCHARGE NOTE PROVIDER - NSDCMRMEDTOKEN_GEN_ALL_CORE_FT
aspirin 81 mg oral tablet: 1 orally once a day  clonazePAM 0.5 mg oral tablet: 1 tab(s) orally once a day as needed for Anxiety/Insomonia  gabapentin 100 mg oral capsule: 1 cap(s) orally 3 times a day  lidocaine 4% topical film: Apply topically to affected area once a day Apply to affected area for 12 hours as needed for pain  losartan 50 mg oral tablet: 1 tab(s) orally once a day  magnesium oxide 400 mg oral tablet: 1 tab(s) orally once a day  metoprolol succinate 25 mg oral tablet, extended release: 0.5 tab(s) orally once a day  raloxifene 60 mg oral tablet: 1 tab(s) orally once a day   aspirin 81 mg oral tablet: 1 orally once a day  cholecalciferol: 1,000 unit(s) orally once a day  cyanocobalamin: 100 microgram(s) orally once a day  ferrous sulfate 325 mg (65 mg elemental iron) oral tablet: 1 tab(s) orally once a day  magnesium oxide 400 mg oral tablet: 1 tab(s) orally once a day  meclizine 25 mg oral tablet: 1 tab(s) orally 3 times a day as needed for Dizziness  pantoprazole 40 mg oral delayed release tablet: 1 tab(s) orally once a day (before a meal)  raloxifene 60 mg oral tablet: 1 tab(s) orally once a day

## 2024-11-04 NOTE — PATIENT PROFILE ADULT - .
OP NOTE    Preop Dx:  40 year old  with pelvic pain and history of endometriosis    Postop Dx:  Same    Procedures:  Robotic assisted hysterectomy with bilateral salpingectomy  Cystoscopy    Surgeons:  Abdoulaye Da Silva    Anesthesia:  General    EBL:  50 cc    Observations:  Normal appearing uterus, tubes and ovaries.   Per cystoscopy, normal appearing bladder without evidence of trauma. Urine seen entering into the bladder through each ureteral orifice.    Complications:  None     Specimens:  Uterus with cervix and bilateral fallopian tubes    Disposition:  Patient stable to the recovery room    Dictated #[id number]  
04-Nov-2024 00:21:44

## 2024-11-04 NOTE — DISCHARGE NOTE PROVIDER - CARE PROVIDER_API CALL
Skip Reid Max  Internal Medicine  [address] [place] [address]  Phone: [phone]  Fax: [phone]  Follow Up Time:     Ruddy Yañez  Gastroenterology  [facility] [facility]  [place] [address]  Phone: [phone]  Fax: [phone]  Follow Up Time:

## 2024-11-05 DIAGNOSIS — K29.70 GASTRITIS, UNSPECIFIED, WITHOUT BLEEDING: ICD-10-CM

## 2024-11-05 DIAGNOSIS — K29.80 DUODENITIS WITHOUT BLEEDING: ICD-10-CM

## 2024-11-05 DIAGNOSIS — D62 ACUTE POSTHEMORRHAGIC ANEMIA: ICD-10-CM

## 2024-11-05 DIAGNOSIS — Z75.8 OTHER PROBLEMS RELATED TO MEDICAL FACILITIES AND OTHER HEALTH CARE: ICD-10-CM

## 2024-11-05 DIAGNOSIS — D64.9 ANEMIA, UNSPECIFIED: ICD-10-CM

## 2024-11-05 LAB
ANION GAP SERPL CALC-SCNC: 6 MMOL/L — SIGNIFICANT CHANGE UP (ref 5–17)
BUN SERPL-MCNC: 9 MG/DL — SIGNIFICANT CHANGE UP (ref 7–18)
CALCIUM SERPL-MCNC: 8 MG/DL — LOW (ref 8.4–10.5)
CHLORIDE SERPL-SCNC: 111 MMOL/L — HIGH (ref 96–108)
CO2 SERPL-SCNC: 27 MMOL/L — SIGNIFICANT CHANGE UP (ref 22–31)
CREAT SERPL-MCNC: 0.5 MG/DL — SIGNIFICANT CHANGE UP (ref 0.5–1.3)
EGFR: 100 ML/MIN/1.73M2 — SIGNIFICANT CHANGE UP
GLUCOSE SERPL-MCNC: 101 MG/DL — HIGH (ref 70–99)
HCT VFR BLD CALC: 21.4 % — LOW (ref 34.5–45)
HCT VFR BLD CALC: 26.3 % — LOW (ref 34.5–45)
HGB BLD-MCNC: 7.5 G/DL — LOW (ref 11.5–15.5)
HGB BLD-MCNC: 9 G/DL — LOW (ref 11.5–15.5)
MAGNESIUM SERPL-MCNC: 2.1 MG/DL — SIGNIFICANT CHANGE UP (ref 1.6–2.6)
MCHC RBC-ENTMCNC: 31 PG — SIGNIFICANT CHANGE UP (ref 27–34)
MCHC RBC-ENTMCNC: 31.8 PG — SIGNIFICANT CHANGE UP (ref 27–34)
MCHC RBC-ENTMCNC: 34.2 G/DL — SIGNIFICANT CHANGE UP (ref 32–36)
MCHC RBC-ENTMCNC: 35 G/DL — SIGNIFICANT CHANGE UP (ref 32–36)
MCV RBC AUTO: 90.7 FL — SIGNIFICANT CHANGE UP (ref 80–100)
MCV RBC AUTO: 90.7 FL — SIGNIFICANT CHANGE UP (ref 80–100)
NRBC # BLD: 0 /100 WBCS — SIGNIFICANT CHANGE UP (ref 0–0)
NRBC # BLD: 0 /100 WBCS — SIGNIFICANT CHANGE UP (ref 0–0)
PHOSPHATE SERPL-MCNC: 3.8 MG/DL — SIGNIFICANT CHANGE UP (ref 2.5–4.5)
PLATELET # BLD AUTO: 198 K/UL — SIGNIFICANT CHANGE UP (ref 150–400)
PLATELET # BLD AUTO: 207 K/UL — SIGNIFICANT CHANGE UP (ref 150–400)
POTASSIUM SERPL-MCNC: 3.6 MMOL/L — SIGNIFICANT CHANGE UP (ref 3.5–5.3)
POTASSIUM SERPL-SCNC: 3.6 MMOL/L — SIGNIFICANT CHANGE UP (ref 3.5–5.3)
RBC # BLD: 2.36 M/UL — LOW (ref 3.8–5.2)
RBC # BLD: 2.9 M/UL — LOW (ref 3.8–5.2)
RBC # FLD: 17.1 % — HIGH (ref 10.3–14.5)
RBC # FLD: 18 % — HIGH (ref 10.3–14.5)
SODIUM SERPL-SCNC: 144 MMOL/L — SIGNIFICANT CHANGE UP (ref 135–145)
WBC # BLD: 5.65 K/UL — SIGNIFICANT CHANGE UP (ref 3.8–10.5)
WBC # BLD: 6.71 K/UL — SIGNIFICANT CHANGE UP (ref 3.8–10.5)
WBC # FLD AUTO: 5.65 K/UL — SIGNIFICANT CHANGE UP (ref 3.8–10.5)
WBC # FLD AUTO: 6.71 K/UL — SIGNIFICANT CHANGE UP (ref 3.8–10.5)

## 2024-11-05 RX ORDER — PANTOPRAZOLE SODIUM 40 MG/1
40 TABLET, DELAYED RELEASE ORAL
Refills: 0 | Status: DISCONTINUED | OUTPATIENT
Start: 2024-11-05 | End: 2024-11-06

## 2024-11-05 RX ADMIN — PANTOPRAZOLE SODIUM 40 MILLIGRAM(S): 40 TABLET, DELAYED RELEASE ORAL at 06:06

## 2024-11-05 RX ADMIN — PANTOPRAZOLE SODIUM 40 MILLIGRAM(S): 40 TABLET, DELAYED RELEASE ORAL at 17:33

## 2024-11-05 RX ADMIN — Medication 100 MILLIGRAM(S): at 06:06

## 2024-11-05 RX ADMIN — Medication 80 MILLILITER(S): at 06:05

## 2024-11-05 NOTE — PROGRESS NOTE ADULT - PROBLEM SELECTOR PLAN 1
presented with abdominal pain, diarrhea (3x/day), black stool  Hgb 6 in ED s/p 2 units pRBC  recent EGD with duodenitis without bleeding, chronic gastritis without bleeding 10/2024  CTAP negative  Hgb 7.5 this AM  LBM 11/03  Pt with dizziness and blurry vision  - transfuse 1 units pRBC  - continue protonix 40 mg daily  - GI Dr Gonsalves following  - maintain active T&S  - f/u CBC

## 2024-11-05 NOTE — PROGRESS NOTE ADULT - PROBLEM SELECTOR PLAN 2
presented with urinary frequency/urgency  UA+  urine culture negative 11/03  s/p CTX 1g  in the ED  - continue ceftriaxone 1 g daily 11/03-

## 2024-11-05 NOTE — PROGRESS NOTE ADULT - SUBJECTIVE AND OBJECTIVE BOX
pt seen in icu [  ], reg med floor [  x ], bed [x  ], chair at bedside [   ]  Patient is awake, alert, lying in bed in NAD. Doing well on room air. She c/o abdomen tenderness.    REVIEW OF SYSTEMS:    CONSTITUTIONAL: No weakness, fevers or chills  EYES/ENT: No visual changes;  No vertigo or throat pain   NECK: No pain or stiffness  RESPIRATORY: No cough, wheezing, hemoptysis; No shortness of breath  CARDIOVASCULAR: No chest pain or palpitations  GASTROINTESTINAL: No abdominal or epigastric pain. No nausea, vomiting, or hematemesis; No diarrhea or constipation. No melena or hematochezia.  GENITOURINARY: No dysuria, frequency or hematuria  NEUROLOGICAL: No numbness or weakness  SKIN: No itching, burning, rashes, or lesions   All other review of systems is negative unless indicated above.    Physical Exam    General: WN/WD NAD  Neurology: A&Ox3, nonfocal, ZHANG x 4  Respiratory: CTA B/L  CV: RRR, S1S2, no murmurs, rubs or gallops  Abdominal: Soft, light tenderness in the left side, ND +BS, Last BM  Extremities: No edema, + peripheral pulses      Allergies  No Known Allergies      Health Issues  Gastrointestinal hemorrhage    HTN (hypertension)    No significant past surgical history        Vitals  T(F): 97.3 (11-05-24 @ 05:16), Max: 98.3 (11-04-24 @ 11:33)  HR: 62 (11-05-24 @ 05:16) (62 - 74)  BP: 103/65 (11-05-24 @ 05:16) (102/65 - 119/90)  RR: 18 (11-05-24 @ 05:16) (14 - 18)  SpO2: 95% (11-05-24 @ 05:16) (95% - 100%)  Wt(kg): --  CAPILLARY BLOOD GLUCOSE      POCT Blood Glucose.: 181 mg/dL (03 Nov 2024 12:22)      Labs                          7.5    5.65  )-----------( 198      ( 05 Nov 2024 06:31 )             21.4       11-05    144  |  111[H]  |  9   ----------------------------<  101[H]  3.6   |  27  |  0.50    Ca    8.0[L]      05 Nov 2024 06:31  Phos  3.8     11-05  Mg     2.1     11-05    TPro  4.9[L]  /  Alb  2.8[L]  /  TBili  1.9[H]  /  DBili  x   /  AST  23  /  ALT  20  /  AlkPhos  27[L]  11-04            Radiology Results  < from: CT Abdomen and Pelvis w/ IV Cont (11.03.24 @ 18:27) >  IMPRESSION:  Unremarkable abdominal pelvic CT        --- End of Report ---      < end of copied text >  < from: Xray Chest 1 View- PORTABLE-Urgent (Xray Chest 1 View- PORTABLE-Urgent .) (11.03.24 @ 14:05) >  IMPRESSION: Grossly clear lungs.    --- End of Report ---      < end of copied text >      Meds    MEDICATIONS  (STANDING):  cefTRIAXone   IVPB 1000 milliGRAM(s) IV Intermittent every 24 hours  pantoprazole  Injectable 40 milliGRAM(s) IV Push every 12 hours      MEDICATIONS  (PRN):         pt seen in icu [  ], reg med floor [  x ], bed [x  ], chair at bedside [   ]  Patient is awake, alert, lying in bed in NAD. Doing well on room air. She c/o abdomen tenderness.    REVIEW OF SYSTEMS:    CONSTITUTIONAL: No weakness, fevers or chills  EYES/ENT: No visual changes;  No vertigo or throat pain   NECK: No pain or stiffness  RESPIRATORY: No cough, wheezing, hemoptysis; No shortness of breath  CARDIOVASCULAR: No chest pain or palpitations  GASTROINTESTINAL: No abdominal or epigastric pain. No nausea, vomiting, or hematemesis; No diarrhea or constipation. + melena no hematochezia.  GENITOURINARY: No dysuria, frequency or hematuria  NEUROLOGICAL: No numbness or weakness  SKIN: No itching, burning, rashes, or lesions   All other review of systems is negative unless indicated above.    Physical Exam    General: WN/WD NAD  Neurology: A&Ox3, nonfocal, ZHANG x 4  Respiratory: CTA B/L  CV: RRR, S1S2, no murmurs, rubs or gallops  Abdominal: Soft, light tenderness in the left side, ND +BS, Last BM  Extremities: No edema, + peripheral pulses      Allergies  No Known Allergies      Health Issues  Gastrointestinal hemorrhage    HTN (hypertension)    No significant past surgical history        Vitals  T(F): 97.3 (11-05-24 @ 05:16), Max: 98.3 (11-04-24 @ 11:33)  HR: 62 (11-05-24 @ 05:16) (62 - 74)  BP: 103/65 (11-05-24 @ 05:16) (102/65 - 119/90)  RR: 18 (11-05-24 @ 05:16) (14 - 18)  SpO2: 95% (11-05-24 @ 05:16) (95% - 100%)  Wt(kg): --  CAPILLARY BLOOD GLUCOSE      POCT Blood Glucose.: 181 mg/dL (03 Nov 2024 12:22)      Labs                          7.5    5.65  )-----------( 198      ( 05 Nov 2024 06:31 )             21.4       11-05    144  |  111[H]  |  9   ----------------------------<  101[H]  3.6   |  27  |  0.50    Ca    8.0[L]      05 Nov 2024 06:31  Phos  3.8     11-05  Mg     2.1     11-05    TPro  4.9[L]  /  Alb  2.8[L]  /  TBili  1.9[H]  /  DBili  x   /  AST  23  /  ALT  20  /  AlkPhos  27[L]  11-04            Radiology Results  < from: CT Abdomen and Pelvis w/ IV Cont (11.03.24 @ 18:27) >  IMPRESSION:  Unremarkable abdominal pelvic CT        --- End of Report ---      < end of copied text >  < from: Xray Chest 1 View- PORTABLE-Urgent (Xray Chest 1 View- PORTABLE-Urgent .) (11.03.24 @ 14:05) >  IMPRESSION: Grossly clear lungs.    --- End of Report ---      < end of copied text >      Meds    MEDICATIONS  (STANDING):  cefTRIAXone   IVPB 1000 milliGRAM(s) IV Intermittent every 24 hours  pantoprazole  Injectable 40 milliGRAM(s) IV Push every 12 hours      MEDICATIONS  (PRN):

## 2024-11-05 NOTE — PROGRESS NOTE ADULT - SUBJECTIVE AND OBJECTIVE BOX
[   ] ICU                                          [   ] CCU                                      [  X ] Medical Floor    Patient is a 69 year old female with GI bleeding. GI consulted to evaluate.        69 year old female from home with past medical history significant for HTN,  presented with 3 days history of melena and diarrhea assocated with intermittent diffuse non radiating crampy abdominal pain. Patient had EGD done 4 days ago and found to have Gastritis and duodenitis with no evidence of bleeding. Patient reports one day after the EGD developed black stool with diarrhea and abdominal pain.  Patient also reports taking ASA daily. Patient denies hematemesis, hematochezia, fever, chills, chest pain, SOB, cough, epistaxis, hemoptysis, cough, hematuria, dysuria, recent traveling or antibiotics intake.      Patient appears comfortable. No new complaints reported, No abdominal pain, N/V, hematemesis, hematochezia, melena, fever, chills, chest pain, SOB, cough or diarrhea reported.          PAST MEDICAL HISTORY    HTN (hypertension)        PAST SURGICAL HISTORY    No significant past surgical history reported        Allergies    No Known Allergies    Intolerances  None         MEDICATIONS  (STANDING):  cefTRIAXone   IVPB 1000 milliGRAM(s) IV Intermittent every 24 hours  lactated ringers. 1000 milliLiter(s) (80 mL/Hr) IV Continuous <Continuous>  pantoprazole  Injectable 40 milliGRAM(s) IV Push every 12 hours         SOCIAL HISTORY  Advanced Directives:       [ X ] Full Code       [  ] DNR  Marital Status:         [ X ] M      [  ] S      [  ] D       [  ] W  Children:       [ X ] Yes      [  ] No  Occupation:        [  ] Employed       [ X ] Unemployed       [  ] Retired  Diet:       [ X ] Regular       [  ] PEG feeding          [  ] NG tube feeding  Drug Use:           [ X ] Patient denied          [  ] Yes  Alcohol:           [ X ] No             [  ] Yes (socially)         [  ] Yes (chronic)  Tobacco:           [  ] Yes           [ X ] No      FAMILY HISTORY  [ X ] Heart Disease            [ X ] Diabetes             [ X ] HTN             [  ] Colon Cancer             [  ] Stomach Cancer              [  ] Pancreatic Cancer        VITALS   Vital Signs Last 24 Hrs  T(C): 36.3 (11-05-24 @ 05:16), Max: 36.8 (11-04-24 @ 11:33)  T(F): 97.3 (11-05-24 @ 05:16), Max: 98.3 (11-04-24 @ 11:33)  HR: 62 (11-05-24 @ 05:16) (62 - 74)  BP: 103/65 (11-05-24 @ 05:16) (102/65 - 119/90)  BP(mean): 78 (11-05-24 @ 05:16) (78 - 89)  RR: 18 (11-05-24 @ 05:16) (14 - 18)  SpO2: 95% (11-05-24 @ 05:16) (95% - 100%)      MEDICATIONS  (STANDING):  cefTRIAXone   IVPB 1000 milliGRAM(s) IV Intermittent every 24 hours  pantoprazole  Injectable 40 milliGRAM(s) IV Push every 12 hours                                 7.5    5.65  )-----------( 198      ( 05 Nov 2024 06:31 )             21.4       11-05    144  |  111[H]  |  9   ----------------------------<  101[H]  3.6   |  27  |  0.50    Ca    8.0[L]      05 Nov 2024 06:31  Phos  3.8     11-05  Mg     2.1     11-05    TPro  4.9[L]  /  Alb  2.8[L]  /  TBili  1.9[H]  /  DBili  x   /  AST  23  /  ALT  20  /  AlkPhos  27[L]  11-04      PT/INR - ( 03 Nov 2024 13:50 )   PT: 12.7 sec;   INR: 1.10 ratio         PTT - ( 03 Nov 2024 13:50 )  PTT:23.2 sec

## 2024-11-05 NOTE — PROGRESS NOTE ADULT - PROBLEM SELECTOR PLAN 3
hold aspirin   s/p transfuse 2 units prbc   f/u rept cbc   f/u iron panel hold aspirin   s/p transfuse 2 units prbc   f/u rept cbc   f/u iron panel  GI follow up  EGD/Colonoscopy

## 2024-11-05 NOTE — PROGRESS NOTE ADULT - SUBJECTIVE AND OBJECTIVE BOX
NP Note discussed with  Primary Attending    INTERVAL HPI/OVERNIGHT EVENTS: no new complaints    MEDICATIONS  (STANDING):  cefTRIAXone   IVPB 1000 milliGRAM(s) IV Intermittent every 24 hours  pantoprazole  Injectable 40 milliGRAM(s) IV Push every 12 hours    MEDICATIONS  (PRN):    _________________________________________________  REVIEW OF SYSTEMS:  CONSTITUTIONAL: No fever,   EYES: dizziness, blurry vision  NECK: No pain or stiffness  RESPIRATORY: No cough; No shortness of breath  CARDIOVASCULAR: No chest pain, no palpitations  GASTROINTESTINAL: LBM . No pain. No nausea or vomiting; No diarrhea   NEUROLOGICAL: No headache or numbness, no tremors  MUSCULOSKELETAL: No joint pain, no muscle pain  GENITOURINARY: no dysuria, no frequency, no hesitancy  PSYCHIATRY: no depression , no anxiety  ALL OTHER  ROS negative      Vital Signs Last 24 Hrs  T(C): 36.4 (2024 14:05), Max: 36.7 (2024 10:45)  T(F): 97.6 (2024 14:05), Max: 98 (2024 10:45)  HR: 70 (2024 14:05) (62 - 70)  BP: 112/70 (2024 14:05) (101/57 - 112/70)  BP(mean): 78 (2024 05:16) (78 - 78)  RR: 17 (2024 14:05) (17 - 18)  SpO2: 98% (2024 14:05) (95% - 98%)    Parameters below as of 2024 14:05  Patient On (Oxygen Delivery Method): room air  ________________________________________________  PHYSICAL EXAM:  GENERAL: NAD  HEENT: Normocephalic;  conjunctivae and sclerae clear; moist mucous membranes;   NECK : supple  CHEST/LUNG: Clear to auscultation bilaterally with good air entry   HEART: S1 S2  regular; no murmurs, gallops or rubs  ABDOMEN: Soft, Nontender, Nondistended; Bowel sounds present  EXTREMITIES: no cyanosis; no edema; no calf tenderness  SKIN: warm and dry; no rash  NERVOUS SYSTEM:  Awake and alert; Oriented  to place, person and time ; no new deficits  _________________________________________________  LABS:                        7.5    5.65  )-----------( 198      ( 2024 06:31 )             21.4     11-    144  |  111[H]  |  9   ----------------------------<  101[H]  3.6   |  27  |  0.50    Ca    8.0[L]      2024 06:31  Phos  3.8     11-  Mg     2.1     -    TPro  4.9[L]  /  Alb  2.8[L]  /  TBili  1.9[H]  /  DBili  x   /  AST  23  /  ALT  20  /  AlkPhos  27[L]  11-04  Iron Total (24 @ 14:50)   Iron Total: 86 ug/dL  Folate, Serum (24 @ 14:50)   Folate, Serum: 6.0 ng/mL  Vitamin B12, Serum (24 @ 14:50)   Vitamin B12, Serum: 707 pg/mL  Urinalysis with Rflx Culture (24 @ 13:00)   Urine Appearance: Cloudy  Color: Yellow  Specific Gravity: 1.023  pH Urine: 5.0  Protein, Urine: Negative mg/dL  Glucose Qualitative, Urine: 100 mg/dL  Ketone - Urine: Trace mg/dL  Blood, Urine: Small  Bilirubin: Negative  Urobilinogen: 0.2 mg/dL  Leukocyte Esterase Concentration: Moderate  Nitrite: Negative  Urine Microscopic-Add On (NC) (24 @ 13:00)   Squamous Epithelial Cells: None Seen  Bacteria: Occasional /HPF  Red Blood Cell - Urine: 1 /HPF  White Blood Cell - Urine: 15 /HPF  Culture - Urine (24 @ 13:00)   Specimen Source: Clean Catch  Culture Results:   <10,000 CFU/mL Normal Urogenital Joann  RADIOLOGY & ADDITIONAL TESTS:  < from: EGD (24 @ 17:00) >  EGD Report  Date: 2024 5:00 PM  Patient Name: ARPIT BELLE  MRN: [id number]  Account Number:[id number]  Gender: Female   (age): 1955 (69)  Instrument(s):GIFHQ 190 ([id number])([id number])  Attending/Fellow:Елена Gonsalves MD  Technician:France Chávez RN  Procedure Room #:PROCEDURE ROOM 2  Referring Physician:  PCP:  Anesthesia Provider:  Nurse(s):Arthur Cerda RN  ASA Class:P2 - 2024 11:56 AM Елена Gonsalves MD  History of Present Illness:melena, Hb 6 from baseline 13, s/p EGD 4 D ago with bx  Administered Medications:  Indications:Melena: 578.1 - K92.1  Anemia: 285.9 - D64.9  Vital Signs:  Procedure:  The procedure, indications, preparation and potential complications were  explained to the patient, who indicated understanding and signed the  corresponding consent forms. MAC was administered by anesthesiologist.  Continuous pulse oximetry and blood pressure monitoring were used throughout the  procedure. Supplemental oxygen was used. Patient was placed in the left lateral  decubitus position. The endoscope was introduced through the mouth and advanced  under direct visualization until the second part of the duodenum was reached.  Patient tolerance to the procedure was good. The procedure was not difficult.  Blood loss was none.  Limitations/Complications:  There were no apparent limitations or complications  Findings:  Esophagus Additional esophagus findings Esophagus: normal..  Stomach Additional stomach findings Stomach: no evidence of active or recent  bleeding. Two 1 mm areas (1 in the cardia, 1 in the body) with evidence of  mucosal damage (likely bx sites) not bleeding..  Duodenum: 2 mm scar in the bulb, likely a bx site, not bleeding. No evidence of  recent or active bleeding..  Other Interventions:    Impressions:  Esophagus: normal. .  Stomach: no evidence of active or recent bleeding. Two 1 mm areas (1 in the  cardia, 1 in the body) with evidence of mucosal damage (likely bx sites) not  bleeding..  Duodenum: 2 mm scar in the bulb, likely a bx site, not bleeding. No evidence  of recent or active bleeding. .  Plan:  Await pathology results.  Return to floor for further management. regular diet.  Specimens:  Additional Notes:  Pathology:  Attending Participation:  Елена Gonsalves MD  Version 1, Electronically signed on 2024 12:05:43 PM by Елена Gonsalves MD  < end of copied text >      < from: Xray Chest 1 View- PORTABLE-Urgent (Xray Chest 1 View- PORTABLE-Urgent .) (24 @ 14:05) >  ACC: [id number] EXAM:  XR CHEST PORTABLE URGENT 1V   ORDERED BY: ASHLEY NICK   PROCEDURE DATE:  2024    INTERPRETATION:  Dizziness.  AP chest  heart mediastinum exaggerated by AP film shallow inspiration. Grossly   clear lungs.  IMPRESSION: Grossly clear lungs.  --- End of Report----  LETICIA AVINA MD; Attending Radiologist  This document has been electronically signed. 2024 11:08AM  < end of copied text >      < from: CT Abdomen and Pelvis w/ IV Cont (24 @ 18:27) >  ACC: [id number] EXAM:  CT ABDOMEN AND PELVIS IC   ORDERED BY: ASHLEY NICK   PROCEDURE DATE:  2024    INTERPRETATION:  CLINICAL INFORMATION: Diarrhea and abdominal pain  COMPARISON: None.  CONTRAST/COMPLICATIONS:  IV Contrast: Omnipaque 350  90 cc administered   10 cc discarded  Oral Contrast: NONE  Complications: None reported at time of study completion  PROCEDURE:  CT of the Abdomen and Pelvis was performed.  Sagittal and coronal reformats were performed.  FINDINGS:  LOWER CHEST: Within normal limits.  LIVER: Within normal limits.  BILE DUCTS: Normal caliber.  GALLBLADDER: Within normal limits.  SPLEEN: Within normal limits.  PANCREAS: Within normal limits.  ADRENALS: Within normal limits.  KIDNEYS/URETERS: Within normal limits.  BLADDER: Within normal limits.  REPRODUCTIVE ORGANS: Hysterectomy.  BOWEL: No bowel obstruction. Appendix is normal.  PERITONEUM/RETROPERITONEUM: Within normal limits.  VESSELS: Within normal limits.  LYMPH NODES: No lymphadenopathy.  ABDOMINAL WALL: Within normal limits.  BONES: Within normal limits.    IMPRESSION:  Unremarkable abdominal pelvic CT  --- End of Report ---  DAVE VÁZQUEZ MD; Attending Radiologist  This document has been electronically signed. Nov  3 2024  6:33PM  < end of copied text >      Imaging  Reviewed:  YES    Consultant(s) Notes Reviewed:   YES    Plan of care was discussed with patient and /or primary care giver; all questions and concerns were addressed

## 2024-11-06 VITALS
DIASTOLIC BLOOD PRESSURE: 63 MMHG | OXYGEN SATURATION: 97 % | RESPIRATION RATE: 18 BRPM | SYSTOLIC BLOOD PRESSURE: 98 MMHG | TEMPERATURE: 98 F | HEART RATE: 60 BPM

## 2024-11-06 DIAGNOSIS — K59.00 CONSTIPATION, UNSPECIFIED: ICD-10-CM

## 2024-11-06 DIAGNOSIS — R42 DIZZINESS AND GIDDINESS: ICD-10-CM

## 2024-11-06 LAB
ALBUMIN SERPL ELPH-MCNC: 2.9 G/DL — LOW (ref 3.5–5)
ALP SERPL-CCNC: 31 U/L — LOW (ref 40–120)
ALT FLD-CCNC: 20 U/L DA — SIGNIFICANT CHANGE UP (ref 10–60)
ANION GAP SERPL CALC-SCNC: 5 MMOL/L — SIGNIFICANT CHANGE UP (ref 5–17)
AST SERPL-CCNC: 22 U/L — SIGNIFICANT CHANGE UP (ref 10–40)
BILIRUB SERPL-MCNC: 0.6 MG/DL — SIGNIFICANT CHANGE UP (ref 0.2–1.2)
BUN SERPL-MCNC: 7 MG/DL — SIGNIFICANT CHANGE UP (ref 7–18)
CALCIUM SERPL-MCNC: 8.5 MG/DL — SIGNIFICANT CHANGE UP (ref 8.4–10.5)
CHLORIDE SERPL-SCNC: 111 MMOL/L — HIGH (ref 96–108)
CO2 SERPL-SCNC: 28 MMOL/L — SIGNIFICANT CHANGE UP (ref 22–31)
CREAT SERPL-MCNC: 0.52 MG/DL — SIGNIFICANT CHANGE UP (ref 0.5–1.3)
EGFR: 99 ML/MIN/1.73M2 — SIGNIFICANT CHANGE UP
FERRITIN SERPL-MCNC: 237 NG/ML — SIGNIFICANT CHANGE UP (ref 13–330)
GLUCOSE SERPL-MCNC: 109 MG/DL — HIGH (ref 70–99)
HCT VFR BLD CALC: 26 % — LOW (ref 34.5–45)
HGB BLD-MCNC: 9 G/DL — LOW (ref 11.5–15.5)
IRON SATN MFR SERPL: 16 % — SIGNIFICANT CHANGE UP (ref 15–50)
IRON SATN MFR SERPL: 39 UG/DL — LOW (ref 40–150)
MAGNESIUM SERPL-MCNC: 2.1 MG/DL — SIGNIFICANT CHANGE UP (ref 1.6–2.6)
MCHC RBC-ENTMCNC: 30.7 PG — SIGNIFICANT CHANGE UP (ref 27–34)
MCHC RBC-ENTMCNC: 34.6 G/DL — SIGNIFICANT CHANGE UP (ref 32–36)
MCV RBC AUTO: 88.7 FL — SIGNIFICANT CHANGE UP (ref 80–100)
NRBC # BLD: 0 /100 WBCS — SIGNIFICANT CHANGE UP (ref 0–0)
PHOSPHATE SERPL-MCNC: 4.7 MG/DL — HIGH (ref 2.5–4.5)
PLATELET # BLD AUTO: 204 K/UL — SIGNIFICANT CHANGE UP (ref 150–400)
POTASSIUM SERPL-MCNC: 4 MMOL/L — SIGNIFICANT CHANGE UP (ref 3.5–5.3)
POTASSIUM SERPL-SCNC: 4 MMOL/L — SIGNIFICANT CHANGE UP (ref 3.5–5.3)
PROT SERPL-MCNC: 5.4 G/DL — LOW (ref 6–8.3)
RBC # BLD: 2.93 M/UL — LOW (ref 3.8–5.2)
RBC # FLD: 18 % — HIGH (ref 10.3–14.5)
SODIUM SERPL-SCNC: 144 MMOL/L — SIGNIFICANT CHANGE UP (ref 135–145)
TIBC SERPL-MCNC: 248 UG/DL — LOW (ref 250–450)
TRANSFERRIN SERPL-MCNC: 203 MG/DL — SIGNIFICANT CHANGE UP (ref 200–360)
UIBC SERPL-MCNC: 209 UG/DL — SIGNIFICANT CHANGE UP (ref 110–370)
WBC # BLD: 4.8 K/UL — SIGNIFICANT CHANGE UP (ref 3.8–10.5)
WBC # FLD AUTO: 4.8 K/UL — SIGNIFICANT CHANGE UP (ref 3.8–10.5)

## 2024-11-06 RX ORDER — SENNA 187 MG
1 TABLET ORAL DAILY
Refills: 0 | Status: DISCONTINUED | OUTPATIENT
Start: 2024-11-06 | End: 2024-11-06

## 2024-11-06 RX ORDER — FERROUS SULFATE 325(65) MG
325 TABLET ORAL DAILY
Refills: 0 | Status: DISCONTINUED | OUTPATIENT
Start: 2024-11-06 | End: 2024-11-06

## 2024-11-06 RX ORDER — MECLIZINE HCL 25 MG
25 TABLET ORAL THREE TIMES A DAY
Refills: 0 | Status: DISCONTINUED | OUTPATIENT
Start: 2024-11-06 | End: 2024-11-06

## 2024-11-06 RX ORDER — CLONAZEPAM 1 MG
1 TABLET ORAL
Refills: 0 | DISCHARGE

## 2024-11-06 RX ORDER — LIDOCAINE HYDROCHLORIDE 40 MG/ML
1 SOLUTION TOPICAL
Refills: 0 | DISCHARGE

## 2024-11-06 RX ORDER — POLYETHYLENE GLYCOL 3350 17 G/17G
17 POWDER, FOR SOLUTION ORAL DAILY
Refills: 0 | Status: DISCONTINUED | OUTPATIENT
Start: 2024-11-06 | End: 2024-11-06

## 2024-11-06 RX ORDER — MECLIZINE HCL 25 MG
1 TABLET ORAL
Qty: 42 | Refills: 0
Start: 2024-11-06 | End: 2024-11-19

## 2024-11-06 RX ORDER — FERROUS SULFATE 325(65) MG
1 TABLET ORAL
Qty: 30 | Refills: 0
Start: 2024-11-06 | End: 2024-12-05

## 2024-11-06 RX ORDER — METOPROLOL TARTRATE 50 MG
0.5 TABLET ORAL
Refills: 0 | DISCHARGE

## 2024-11-06 RX ORDER — GABAPENTIN 300 MG/1
1 CAPSULE ORAL
Refills: 0 | DISCHARGE

## 2024-11-06 RX ORDER — PANTOPRAZOLE SODIUM 40 MG/1
1 TABLET, DELAYED RELEASE ORAL
Qty: 30 | Refills: 0
Start: 2024-11-06 | End: 2024-12-05

## 2024-11-06 RX ORDER — LOSARTAN POTASSIUM 25 MG/1
1 TABLET ORAL
Refills: 0 | DISCHARGE

## 2024-11-06 RX ADMIN — Medication 100 MILLIGRAM(S): at 05:36

## 2024-11-06 RX ADMIN — POLYETHYLENE GLYCOL 3350 17 GRAM(S): 17 POWDER, FOR SOLUTION ORAL at 12:07

## 2024-11-06 RX ADMIN — Medication 325 MILLIGRAM(S): at 12:07

## 2024-11-06 RX ADMIN — PANTOPRAZOLE SODIUM 40 MILLIGRAM(S): 40 TABLET, DELAYED RELEASE ORAL at 05:36

## 2024-11-06 RX ADMIN — Medication 1 TABLET(S): at 12:07

## 2024-11-06 RX ADMIN — Medication 25 MILLIGRAM(S): at 16:47

## 2024-11-06 NOTE — PROGRESS NOTE ADULT - SUBJECTIVE AND OBJECTIVE BOX
[   ] ICU                                          [   ] CCU                                      [ X  ] Medical Floor    Patient is a 69 year old female with GI bleeding. GI consulted to evaluate.        69 year old female from home with past medical history significant for HTN,  presented with 3 days history of melena and diarrhea assocated with intermittent diffuse non radiating crampy abdominal pain. Patient had EGD done 4 days ago and found to have Gastritis and duodenitis with no evidence of bleeding. Patient reports one day after the EGD developed black stool with diarrhea and abdominal pain.  Patient also reports taking ASA daily. Patient denies hematemesis, hematochezia, fever, chills, chest pain, SOB, cough, epistaxis, hemoptysis, cough, hematuria, dysuria, recent traveling or antibiotics intake.      Patient appears comfortable. No new complaints reported, No abdominal pain, N/V, hematemesis, hematochezia, melena, fever, chills, chest pain, SOB, cough or diarrhea reported.          PAST MEDICAL HISTORY    HTN (hypertension)        PAST SURGICAL HISTORY    No significant past surgical history reported        Allergies    No Known Allergies    Intolerances  None         MEDICATIONS  (STANDING):  cefTRIAXone   IVPB 1000 milliGRAM(s) IV Intermittent every 24 hours  lactated ringers. 1000 milliLiter(s) (80 mL/Hr) IV Continuous <Continuous>  pantoprazole  Injectable 40 milliGRAM(s) IV Push every 12 hours         SOCIAL HISTORY  Advanced Directives:       [ X ] Full Code       [  ] DNR  Marital Status:         [ X ] M      [  ] S      [  ] D       [  ] W  Children:       [ X ] Yes      [  ] No  Occupation:        [  ] Employed       [ X ] Unemployed       [  ] Retired  Diet:       [ X ] Regular       [  ] PEG feeding          [  ] NG tube feeding  Drug Use:           [ X ] Patient denied          [  ] Yes  Alcohol:           [ X ] No             [  ] Yes (socially)         [  ] Yes (chronic)  Tobacco:           [  ] Yes           [ X ] No      FAMILY HISTORY  [ X ] Heart Disease            [ X ] Diabetes             [ X ] HTN             [  ] Colon Cancer             [  ] Stomach Cancer              [  ] Pancreatic Cancer        VITALS   Vital Signs Last 24 Hrs  T(C): 36.4 (11-06-24 @ 05:04), Max: 37.3 (11-05-24 @ 21:09)  T(F): 97.6 (11-06-24 @ 05:04), Max: 99.2 (11-05-24 @ 21:09)  HR: 62 (11-06-24 @ 05:04) (62 - 70)  BP: 111/71 (11-06-24 @ 05:04) (109/69 - 112/70)   RR: 17 (11-06-24 @ 05:04) (17 - 18)  SpO2: 95% (11-06-24 @ 05:04) (95% - 98%)    MEDICATIONS  (STANDING):  ferrous    sulfate 325 milliGRAM(s) Oral daily  pantoprazole    Tablet 40 milliGRAM(s) Oral before breakfast  polyethylene glycol 3350 17 Gram(s) Oral daily  senna 1 Tablet(s) Oral daily                               9.0    4.80  )-----------( 204      ( 06 Nov 2024 06:05 )             26.0       11-06    144  |  111[H]  |  7   ----------------------------<  109[H]  4.0   |  28  |  0.52    Ca    8.5      06 Nov 2024 06:05  Phos  4.7     11-06  Mg     2.1     11-06    TPro  5.4[L]  /  Alb  2.9[L]  /  TBili  0.6  /  DBili  x   /  AST  22  /  ALT  20  /  AlkPhos  31[L]  11-06

## 2024-11-06 NOTE — PROGRESS NOTE ADULT - PROVIDER SPECIALTY LIST ADULT
Internal Medicine
Gastroenterology
Gastroenterology

## 2024-11-06 NOTE — PROGRESS NOTE ADULT - ASSESSMENT
1. A. Melena   2. Anemia  3. S/p EGD no evidence of bleeding  4. R/o Right side colonic bleeding    Suggestions:    1. Monitor H/H  2. Transfuse PRBC as needed  3. Protonix 40mg daily  4. Colonoscopy  5. Avoid NSAID  6. Monitor electrolytes  7. DVT prophylaxis       
68 yo F, from home, with pmhx of HTN who presented with diarrhea, dark stool, abdominal pain, dizziness, and urinary frequency. Hgb in ED 6 s/p 2 units pRBC. Pt admitted for melena 2/2 chronic gastritis. GI consulted, s/p EGD 11/04 with no bleeding. Pt with dizziness and blurry vision, Hgb 7.5 s/p 1 units pRBC, with appropriate response in hemoglobin. Pt still with dizziness, started on meclizine. 
Pt is a 69 y/ o F, from home and lives alone with PMH of HTN,  presents with 3 days history of  diarrhea ( 3x/day), dark stool and diffused abdominal pain,  Admitted to medicine for Melena 2/2 chronic gastritis, UTI, started on Ceftriaxone.  Pt is s/p 2 units of PRBC, hemoglobin initially 6.0 and now 7.7 post transfusion.  GI consulted and NPO for now pending GI formal input and recs.      
68 yo F, from home, with pmhx of HTN who presented with diarrhea, dark stool, abdominal pain, dizziness, and urinary frequency. Hgb in ED 6 s/p 2 units pRBC. Pt admitted for melena 2/2 chronic gastritis. GI consulted, s/p EGD 11/04 with no bleeding. Pt dizzy with blurry vision this AM, will transfuse another pRBC.
1. Melena stopped  2. Anemia (H/H stable)  3. S/p EGD no evidence of bleeding  4. R/o Right side colonic bleeding    Suggestions:    1. Monitor H/H  2. Transfuse PRBC as needed  3. Protonix 40mg daily  4. Colonoscopy out patient  5. Avoid NSAID  6. Monitor electrolytes  7. DVT prophylaxis

## 2024-11-06 NOTE — PROGRESS NOTE ADULT - PROBLEM SELECTOR PLAN 5
- from home  - s/p 1 unit pRBC  - f/u AM CBC history of HTN, takes Metoprolol Succ 12.5 mg daily  normotensive  - hold antihypertensives iso GIB

## 2024-11-06 NOTE — PROGRESS NOTE ADULT - SUBJECTIVE AND OBJECTIVE BOX
Patient is a 69y old  Female who presents with a chief complaint of Melena (05 Nov 2024 18:16)    pt seen in icu [  ], reg med floor [ x  ], bed [ x ], chair at bedside [   ], a+o x3 [ x ], lethargic [  ],    nad [x ]        Allergies    No Known Allergies        Vitals    T(F): 97.6 (11-06-24 @ 05:04), Max: 99.2 (11-05-24 @ 21:09)  HR: 62 (11-06-24 @ 05:04) (62 - 70)  BP: 111/71 (11-06-24 @ 05:04) (101/57 - 112/70)  RR: 17 (11-06-24 @ 05:04) (17 - 18)  SpO2: 95% (11-06-24 @ 05:04) (95% - 98%)  Wt(kg): --  CAPILLARY BLOOD GLUCOSE          Labs                          9.0    4.80  )-----------( 204      ( 06 Nov 2024 06:05 )             26.0       11-05    144  |  111[H]  |  9   ----------------------------<  101[H]  3.6   |  27  |  0.50    Ca    8.0[L]      05 Nov 2024 06:31  Phos  3.8     11-05  Mg     2.1     11-05    TPro  4.9[L]  /  Alb  2.8[L]  /  TBili  1.9[H]  /  DBili  x   /  AST  23  /  ALT  20  /  AlkPhos  27[L]  11-04          Troponin I, High Sensitivity Result: 4.9 ng/L (11-03-24 @ 13:50)    Clean Catch  11-03 @ 13:00   <10,000 CFU/mL Normal Urogenital Joann  --  --          Radiology Results      Meds    MEDICATIONS  (STANDING):  pantoprazole    Tablet 40 milliGRAM(s) Oral before breakfast      MEDICATIONS  (PRN):      Physical Exam    Neuro :  no focal deficits  Respiratory: CTA B/L  CV: RRR, S1S2, no murmurs,   Abdominal: Soft, NT, ND +BS,  Extremities: No edema, + peripheral pulses      ASSESSMENT    symptomatic anemia likely 2nd to gi bleed,   uti,   h/o htn,   gastritis,   duodenitis      PLAN    hold aspirin,   s/p transfuse 2 units prbc,   f/u rept cbc   f/u iron panel   cont protonix   gi cons  cont rocephin,   f/u ucx   cont current meds       Patient is a 69y old  Female who presents with a chief complaint of Melena (05 Nov 2024 18:16)    pt seen in icu [  ], reg med floor [ x  ], bed [ x ], chair at bedside [   ], a+o x3 [ x ], lethargic [  ],    nad [x ]        Allergies    No Known Allergies        Vitals    T(F): 97.6 (11-06-24 @ 05:04), Max: 99.2 (11-05-24 @ 21:09)  HR: 62 (11-06-24 @ 05:04) (62 - 70)  BP: 111/71 (11-06-24 @ 05:04) (101/57 - 112/70)  RR: 17 (11-06-24 @ 05:04) (17 - 18)  SpO2: 95% (11-06-24 @ 05:04) (95% - 98%)  Wt(kg): --  CAPILLARY BLOOD GLUCOSE          Labs                          9.0    4.80  )-----------( 204      ( 06 Nov 2024 06:05 )             26.0       11-05    144  |  111[H]  |  9   ----------------------------<  101[H]  3.6   |  27  |  0.50    Ca    8.0[L]      05 Nov 2024 06:31  Phos  3.8     11-05  Mg     2.1     11-05    TPro  4.9[L]  /  Alb  2.8[L]  /  TBili  1.9[H]  /  DBili  x   /  AST  23  /  ALT  20  /  AlkPhos  27[L]  11-04          Troponin I, High Sensitivity Result: 4.9 ng/L (11-03-24 @ 13:50)    Clean Catch  11-03 @ 13:00   <10,000 CFU/mL Normal Urogenital Joann  --  --          Radiology Results      Meds    MEDICATIONS  (STANDING):  pantoprazole    Tablet 40 milliGRAM(s) Oral before breakfast      MEDICATIONS  (PRN):      Physical Exam    Neuro :  no focal deficits  Respiratory: CTA B/L  CV: RRR, S1S2, no murmurs,   Abdominal: Soft, NT, ND +BS,  Extremities: No edema, + peripheral pulses      ASSESSMENT    symptomatic anemia likely 2nd to gi bleed,   uti,   h/o htn,   gastritis,   duodenitis      PLAN    resume aspirin,   s/p transfuse 2 units prbc,   cbc stable     iron panel with low iron noted above    supplement iron   s/p egd with Esophagus: normal. Stomach: no evidence of active or   recent bleeding. Two 1 mm areas (1 in the cardia, 1 in the body) with   evidence of mucosal damage (likely bx sites) not bleeding. .  Duodenum: 2 mm scar in the bulb, likely a bx site, not bleeding.   No evidence of recent or active bleeding. .  f/u pathology results.   regular diet.  gi f/u   R/o Right side colonic bleeding  Transfuse PRBC as needed  Protonix 40mg daily  f/u gi for Colonoscopy outpt   Avoid NSAID  completed course of rocephin,   ucx neg noted above  cont current meds   pt stable for d/c

## 2024-11-06 NOTE — DISCHARGE NOTE NURSING/CASE MANAGEMENT/SOCIAL WORK - FINANCIAL ASSISTANCE
[facility] provides services at a reduced cost to those who are determined to be eligible through [facility]’s financial assistance program. Information regarding [facility]’s financial assistance program can be found by going to [email or web address] or by calling [phone].

## 2024-11-06 NOTE — DISCHARGE NOTE NURSING/CASE MANAGEMENT/SOCIAL WORK - PATIENT PORTAL LINK FT
You can access the FollowMyHealth Patient Portal offered by [facility] by registering at the following website: [email or web address]. By joining [employer]’s FollowMyHealth portal, you will also be able to view your health information using other applications (apps) compatible with our system.

## 2024-11-06 NOTE — PROGRESS NOTE ADULT - PROBLEM SELECTOR PLAN 3
history of HTN, takes Metoprolol Succ 12.5 mg daily  normotensive  - hold antihypertensives iso GIB pt with dizziness while lying down  Hgb 9.0  - start meclizine 25 mg TID PRN  - monitor CBC

## 2024-11-06 NOTE — PROGRESS NOTE ADULT - SUBJECTIVE AND OBJECTIVE BOX
NP Note discussed with  Primary Attending    INTERVAL HPI/OVERNIGHT EVENTS: Pt resting in bed, still with dizziness. Received 1 units pRBC yesterday with appropriate response in hemoglobin.     MEDICATIONS  (STANDING):  ferrous    sulfate 325 milliGRAM(s) Oral daily  pantoprazole    Tablet 40 milliGRAM(s) Oral before breakfast  polyethylene glycol 3350 17 Gram(s) Oral daily  senna 1 Tablet(s) Oral daily    MEDICATIONS  (PRN):  bisacodyl Suppository 10 milliGRAM(s) Rectal daily PRN Constipation  meclizine 25 milliGRAM(s) Oral three times a day PRN Dizziness  __________________________________________________  REVIEW OF SYSTEMS:  CONSTITUTIONAL: dizziness,   EYES: no acute visual disturbances  NECK: No pain or stiffness  RESPIRATORY: No cough; No shortness of breath  CARDIOVASCULAR: No chest pain, no palpitations  GASTROINTESTINAL: LBM . No pain. No nausea or vomiting; No diarrhea   NEUROLOGICAL: No headache or numbness, no tremors  MUSCULOSKELETAL: No joint pain, no muscle pain  GENITOURINARY: no dysuria, no frequency, no hesitancy  PSYCHIATRY: no depression , no anxiety  ALL OTHER  ROS negative      Vital Signs Last 24 Hrs  T(C): 36.4 (2024 11:55), Max: 37.3 (2024 21:09)  T(F): 97.6 (2024 11:55), Max: 99.2 (2024 21:09)  HR: 60 (2024 11:55) (60 - 70)  BP: 98/63 (2024 11:55) (98/63 - 112/70)  BP(mean): --  RR: 18 (2024 11:55) (17 - 18)  SpO2: 97% (2024 11:55) (95% - 98%)    Parameters below as of 2024 11:55  Patient On (Oxygen Delivery Method): room air    ________________________________________________  PHYSICAL EXAM:  GENERAL: NAD  HEENT: Normocephalic; conjunctivae and sclerae clear; moist mucous membranes;   NECK : supple  CHEST/LUNG: Clear to auscultation bilaterally with good air entry   HEART: S1 S2  regular; no murmurs, gallops or rubs  ABDOMEN: Soft, Nontender, Nondistended; Bowel sounds present  EXTREMITIES: no cyanosis; no edema; no calf tenderness  SKIN: warm and dry; no rash  NERVOUS SYSTEM:  Awake and alert; Oriented  to place, person and time ; no new deficits  _________________________________________________  LABS:                        9.0    4.80  )-----------( 204      ( 2024 06:05 )             26.0     11-06    144  |  111[H]  |  7   ----------------------------<  109[H]  4.0   |  28  |  0.52    Ca    8.5      2024 06:05  Phos  4.7     11-  Mg     2.1     -    TPro  5.4[L]  /  Alb  2.9[L]  /  TBili  0.6  /  DBili  x   /  AST  22  /  ALT  20  /  AlkPhos  31[L]  11-06  Iron Total (24 @ 14:50)   Iron Total: 86 ug/dL  Folate, Serum (24 @ 14:50)   Folate, Serum: 6.0 ng/mL  Vitamin B12, Serum (24 @ 14:50)   Vitamin B12, Serum: 707 pg/mL  Urinalysis with Rflx Culture (24 @ 13:00)   Urine Appearance: Cloudy  Color: Yellow  Specific Gravity: 1.023  pH Urine: 5.0  Protein, Urine: Negative mg/dL  Glucose Qualitative, Urine: 100 mg/dL  Ketone - Urine: Trace mg/dL  Blood, Urine: Small  Bilirubin: Negative  Urobilinogen: 0.2 mg/dL  Leukocyte Esterase Concentration: Moderate  Nitrite: Negative  Urine Microscopic-Add On (NC) (24 @ 13:00)   Squamous Epithelial Cells: None Seen  Bacteria: Occasional /HPF  Red Blood Cell - Urine: 1 /HPF  White Blood Cell - Urine: 15 /HPF  Culture - Urine (24 @ 13:00)   Specimen Source: Clean Catch  Culture Results:   <10,000 CFU/mL Normal Urogenital Joann  RADIOLOGY & ADDITIONAL TESTS:  < from: EGD (24 @ 17:00) >  EGD Report  Date: 2024 5:00 PM  Patient Name: ARPIT BELLE  MRN: [id number]  Account Number:[id number]  Gender: Female   (age): 1955 (69)  Instrument(s):GIFHQ 190 ([id number])([id number])  Attending/Fellow:Елена Gonsalves MD  Technician:France Chávez RN  Procedure Room #:PROCEDURE ROOM 2  Referring Physician:  PCP:  Anesthesia Provider:  Nurse(s):Arthur Cerda RN  ASA Class:P2 - 2024 11:56 AM Елена Gonsalves MD  History of Present Illness:melena, Hb 6 from baseline 13, s/p EGD 4 D ago with bx  Administered Medications:  Indications:Melena: 578.1 - K92.1  Anemia: 285.9 - D64.9  Vital Signs:  Procedure:  The procedure, indications, preparation and potential complications were  explained to the patient, who indicated understanding and signed the  corresponding consent forms. MAC was administered by anesthesiologist.  Continuous pulse oximetry and blood pressure monitoring were used throughout the  procedure. Supplemental oxygen was used. Patient was placed in the left lateral  decubitus position. The endoscope was introduced through the mouth and advanced  under direct visualization until the second part of the duodenum was reached.  Patient tolerance to the procedure was good. The procedure was not difficult.  Blood loss was none.  Limitations/Complications:  There were no apparent limitations or complications  Findings:  Esophagus Additional esophagus findings Esophagus: normal..  Stomach Additional stomach findings Stomach: no evidence of active or recent  bleeding. Two 1 mm areas (1 in the cardia, 1 in the body) with evidence of  mucosal damage (likely bx sites) not bleeding..  Duodenum: 2 mm scar in the bulb, likely a bx site, not bleeding. No evidence of  recent or active bleeding..  Other Interventions:    Impressions:  Esophagus: normal. .  Stomach: no evidence of active or recent bleeding. Two 1 mm areas (1 in the  cardia, 1 in the body) with evidence of mucosal damage (likely bx sites) not  bleeding..  Duodenum: 2 mm scar in the bulb, likely a bx site, not bleeding. No evidence  of recent or active bleeding. .  Plan:  Await pathology results.  Return to floor for further management. regular diet.  Specimens:  Additional Notes:  Pathology:  Attending Participation:  Елена Gonsalves MD  Version 1, Electronically signed on 2024 12:05:43 PM by Елена Gonsalves MD  < end of copied text >      < from: Xray Chest 1 View- PORTABLE-Urgent (Xray Chest 1 View- PORTABLE-Urgent .) (24 @ 14:05) >  ACC: [id number] EXAM:  XR CHEST PORTABLE URGENT 1V   ORDERED BY: ASHLEY NICK   PROCEDURE DATE:  2024    INTERPRETATION:  Dizziness.  AP chest  heart mediastinum exaggerated by AP film shallow inspiration. Grossly   clear lungs.  IMPRESSION: Grossly clear lungs.  --- End of Report----  LETICIA AVINA MD; Attending Radiologist  This document has been electronically signed. 2024 11:08AM  < end of copied text >      < from: CT Abdomen and Pelvis w/ IV Cont (24 @ 18:27) >  ACC: [id number] EXAM:  CT ABDOMEN AND PELVIS IC   ORDERED BY: ASHLEY NICK   PROCEDURE DATE:  2024    INTERPRETATION:  CLINICAL INFORMATION: Diarrhea and abdominal pain  COMPARISON: None.  CONTRAST/COMPLICATIONS:  IV Contrast: Omnipaque 350  90 cc administered   10 cc discarded  Oral Contrast: NONE  Complications: None reported at time of study completion  PROCEDURE:  CT of the Abdomen and Pelvis was performed.  Sagittal and coronal reformats were performed.  FINDINGS:  LOWER CHEST: Within normal limits.  LIVER: Within normal limits.  BILE DUCTS: Normal caliber.  GALLBLADDER: Within normal limits.  SPLEEN: Within normal limits.  PANCREAS: Within normal limits.  ADRENALS: Within normal limits.  KIDNEYS/URETERS: Within normal limits.  BLADDER: Within normal limits.  REPRODUCTIVE ORGANS: Hysterectomy.  BOWEL: No bowel obstruction. Appendix is normal.  PERITONEUM/RETROPERITONEUM: Within normal limits.  VESSELS: Within normal limits.  LYMPH NODES: No lymphadenopathy.  ABDOMINAL WALL: Within normal limits.  BONES: Within normal limits.    IMPRESSION:  Unremarkable abdominal pelvic CT  --- End of Report ---  DAVE VÁZQUEZ MD; Attending Radiologist  This document has been electronically signed. Nov  3 2024  6:33PM  < end of copied text >  Imaging  Reviewed:  YES    Consultant(s) Notes Reviewed:   YES      Plan of care was discussed with patient and /or primary care giver; all questions and concerns were addressed

## 2024-11-06 NOTE — DISCHARGE NOTE NURSING/CASE MANAGEMENT/SOCIAL WORK - NSDCPEFALRISK_GEN_ALL_CORE
For information on Fall & Injury Prevention, visit: [email or web address] OR  [email or web address] OR  https://www.cdc.gov/steadi/patient.html

## 2024-11-06 NOTE — PROGRESS NOTE ADULT - NEGATIVE MUSCULOSKELETAL SYMPTOMS
no muscle cramps/no stiffness/no neck pain/no arm pain L/no arm pain R/no leg pain L/no leg pain R
no muscle cramps/no stiffness/no neck pain/no arm pain L/no arm pain R/no leg pain L/no leg pain R

## 2024-11-06 NOTE — PROGRESS NOTE ADULT - PROBLEM SELECTOR PLAN 1
presented with abdominal pain, diarrhea (3x/day), black stool  Hgb 6 in ED s/p 2 units pRBC  recent EGD with duodenitis without bleeding, chronic gastritis without bleeding 10/2024  CTAP negative  Hgb 7.5 with blurry vision and dizziness s/p 1 unit pRBC. post transfusion Hgb 9.0  LBM 11/03  Hgb 9.0 this AM.   - transfuse 1 units pRBC  - continue protonix 40 mg daily  - GI Dr Gonsalves following  - maintain active T&S  - f/u CBC presented with abdominal pain, diarrhea (3x/day), black stool  Hgb 6 in ED s/p 2 units pRBC  recent EGD with duodenitis without bleeding, chronic gastritis without bleeding 10/2024  CTAP negative  Hgb 7.5 with blurry vision and dizziness s/p 1 unit pRBC. post transfusion Hgb 9.0  LBM 11/03  Hgb 9.0 this AM.   - continue protonix 40 mg daily  - GI Dr Gonsalves following -> outpatient colonoscopy  - monitor CBC

## 2024-11-06 NOTE — PROGRESS NOTE ADULT - PROBLEM SELECTOR PLAN 2
presented with urinary frequency/urgency  UA+  urine culture negative 11/03  s/p CTX 1g  in the ED  - continue ceftriaxone 1 g daily 11/03- presented with urinary frequency/urgency  UA+  urine culture negative 11/03  s/p CTX 1g  in the ED  - completed ceftriaxone 1 g daily 11/03-11/06

## 2024-11-06 NOTE — PROGRESS NOTE ADULT - PROBLEM SELECTOR PLAN 4
- DVT ppx: SCDs  - GI ppx: continue protonix 40 mg daily LBM 11/03  - continue miralax daily  - continue senna daily  - bisacodyl suppository PRN

## 2024-11-06 NOTE — PROGRESS NOTE ADULT - NEGATIVE GENERAL SYMPTOMS
no fever/no chills/no sweating/no anorexia/no polyphagia/no polyuria/no polydipsia
I have personally seen and examined this patient.  I have fully participated in the care of this patient. I have reviewed all pertinent clinical information, including history, physical exam, plan and the Resident’s note and agree except as noted.
no fever/no chills/no sweating/no anorexia/no polyphagia/no polyuria/no polydipsia

## 2024-11-06 NOTE — PROGRESS NOTE ADULT - NEGATIVE GASTROINTESTINAL SYMPTOMS
no nausea/no vomiting/no hematochezia/no steatorrhea/no jaundice/no hiccoughs
no nausea/no vomiting/no hematochezia/no steatorrhea/no jaundice/no hiccoughs

## 2024-11-26 PROCEDURE — 36415 COLL VENOUS BLD VENIPUNCTURE: CPT

## 2024-11-26 PROCEDURE — 36430 TRANSFUSION BLD/BLD COMPNT: CPT

## 2024-11-26 PROCEDURE — 96374 THER/PROPH/DIAG INJ IV PUSH: CPT

## 2024-11-26 PROCEDURE — 83540 ASSAY OF IRON: CPT

## 2024-11-26 PROCEDURE — 86900 BLOOD TYPING SEROLOGIC ABO: CPT

## 2024-11-26 PROCEDURE — 80048 BASIC METABOLIC PNL TOTAL CA: CPT

## 2024-11-26 PROCEDURE — 74177 CT ABD & PELVIS W/CONTRAST: CPT | Mod: MC

## 2024-11-26 PROCEDURE — 82746 ASSAY OF FOLIC ACID SERUM: CPT

## 2024-11-26 PROCEDURE — 84466 ASSAY OF TRANSFERRIN: CPT

## 2024-11-26 PROCEDURE — 85027 COMPLETE CBC AUTOMATED: CPT

## 2024-11-26 PROCEDURE — 96375 TX/PRO/DX INJ NEW DRUG ADDON: CPT

## 2024-11-26 PROCEDURE — 85730 THROMBOPLASTIN TIME PARTIAL: CPT

## 2024-11-26 PROCEDURE — 86901 BLOOD TYPING SEROLOGIC RH(D): CPT

## 2024-11-26 PROCEDURE — 82607 VITAMIN B-12: CPT

## 2024-11-26 PROCEDURE — 87086 URINE CULTURE/COLONY COUNT: CPT

## 2024-11-26 PROCEDURE — 83550 IRON BINDING TEST: CPT

## 2024-11-26 PROCEDURE — 93005 ELECTROCARDIOGRAM TRACING: CPT

## 2024-11-26 PROCEDURE — 83690 ASSAY OF LIPASE: CPT

## 2024-11-26 PROCEDURE — 99285 EMERGENCY DEPT VISIT HI MDM: CPT | Mod: 25

## 2024-11-26 PROCEDURE — 71045 X-RAY EXAM CHEST 1 VIEW: CPT

## 2024-11-26 PROCEDURE — P9040: CPT

## 2024-11-26 PROCEDURE — 86923 COMPATIBILITY TEST ELECTRIC: CPT

## 2024-11-26 PROCEDURE — 84484 ASSAY OF TROPONIN QUANT: CPT

## 2024-11-26 PROCEDURE — 85025 COMPLETE CBC W/AUTO DIFF WBC: CPT

## 2024-11-26 PROCEDURE — 82728 ASSAY OF FERRITIN: CPT

## 2024-11-26 PROCEDURE — 81001 URINALYSIS AUTO W/SCOPE: CPT

## 2024-11-26 PROCEDURE — 83735 ASSAY OF MAGNESIUM: CPT

## 2024-11-26 PROCEDURE — 82962 GLUCOSE BLOOD TEST: CPT

## 2024-11-26 PROCEDURE — 85610 PROTHROMBIN TIME: CPT

## 2024-11-26 PROCEDURE — 86850 RBC ANTIBODY SCREEN: CPT

## 2024-11-26 PROCEDURE — 84100 ASSAY OF PHOSPHORUS: CPT

## 2024-11-26 PROCEDURE — 80053 COMPREHEN METABOLIC PANEL: CPT

## (undated) DEVICE — KIT ENDO PROCEDURE CUST W/VLV

## (undated) DEVICE — VENODYNE/SCD SLEEVE CALF MEDIUM

## (undated) DEVICE — SOL INJ NS 0.9% 500ML 1-PORT

## (undated) DEVICE — MASK O2 MICROSTREAM SAMPLE LINE MED/LRG 10FT

## (undated) DEVICE — BITE BLOCK ADULT 20 X 27MM (GREEN)

## (undated) DEVICE — VALVE ENDO SURESEAL II 0-5FR

## (undated) DEVICE — TUBING CANNULA SALTER LABS NASAL ADULT 7FT

## (undated) DEVICE — BITE BLOCK MOUTHPCW/STRAP